# Patient Record
Sex: FEMALE | Race: WHITE | NOT HISPANIC OR LATINO | Employment: FULL TIME | URBAN - METROPOLITAN AREA
[De-identification: names, ages, dates, MRNs, and addresses within clinical notes are randomized per-mention and may not be internally consistent; named-entity substitution may affect disease eponyms.]

---

## 2017-01-09 ENCOUNTER — APPOINTMENT (OUTPATIENT)
Dept: PREADMISSION TESTING | Facility: HOSPITAL | Age: 57
End: 2017-01-09

## 2017-01-09 VITALS
TEMPERATURE: 98.9 F | BODY MASS INDEX: 23.14 KG/M2 | HEIGHT: 66 IN | WEIGHT: 144 LBS | RESPIRATION RATE: 16 BRPM | OXYGEN SATURATION: 100 % | SYSTOLIC BLOOD PRESSURE: 128 MMHG | HEART RATE: 90 BPM | DIASTOLIC BLOOD PRESSURE: 85 MMHG

## 2017-01-09 LAB
ABO GROUP BLD: NORMAL
ANION GAP SERPL CALCULATED.3IONS-SCNC: 13.4 MMOL/L
BASOPHILS # BLD AUTO: 0.05 10*3/MM3 (ref 0–0.2)
BASOPHILS NFR BLD AUTO: 1.2 % (ref 0–1.5)
BLD GP AB SCN SERPL QL: NEGATIVE
BUN BLD-MCNC: 17 MG/DL (ref 6–20)
BUN/CREAT SERPL: 23 (ref 7–25)
CALCIUM SPEC-SCNC: 9.4 MG/DL (ref 8.6–10.5)
CHLORIDE SERPL-SCNC: 102 MMOL/L (ref 98–107)
CO2 SERPL-SCNC: 25.6 MMOL/L (ref 22–29)
CREAT BLD-MCNC: 0.74 MG/DL (ref 0.57–1)
DEPRECATED RDW RBC AUTO: 40.5 FL (ref 37–54)
EOSINOPHIL # BLD AUTO: 0.24 10*3/MM3 (ref 0–0.7)
EOSINOPHIL NFR BLD AUTO: 5.5 % (ref 0.3–6.2)
ERYTHROCYTE [DISTWIDTH] IN BLOOD BY AUTOMATED COUNT: 12 % (ref 11.7–13)
GFR SERPL CREATININE-BSD FRML MDRD: 82 ML/MIN/1.73
GLUCOSE BLD-MCNC: 104 MG/DL (ref 65–99)
HCT VFR BLD AUTO: 38.3 % (ref 35.6–45.5)
HGB BLD-MCNC: 12.8 G/DL (ref 11.9–15.5)
IMM GRANULOCYTES # BLD: 0 10*3/MM3 (ref 0–0.03)
IMM GRANULOCYTES NFR BLD: 0 % (ref 0–0.5)
LYMPHOCYTES # BLD AUTO: 1.52 10*3/MM3 (ref 0.9–4.8)
LYMPHOCYTES NFR BLD AUTO: 35 % (ref 19.6–45.3)
MCH RBC QN AUTO: 31 PG (ref 26.9–32)
MCHC RBC AUTO-ENTMCNC: 33.4 G/DL (ref 32.4–36.3)
MCV RBC AUTO: 92.7 FL (ref 80.5–98.2)
MONOCYTES # BLD AUTO: 0.3 10*3/MM3 (ref 0.2–1.2)
MONOCYTES NFR BLD AUTO: 6.9 % (ref 5–12)
NEUTROPHILS # BLD AUTO: 2.23 10*3/MM3 (ref 1.9–8.1)
NEUTROPHILS NFR BLD AUTO: 51.4 % (ref 42.7–76)
PLATELET # BLD AUTO: 205 10*3/MM3 (ref 140–500)
PMV BLD AUTO: 10.4 FL (ref 6–12)
POTASSIUM BLD-SCNC: 3.7 MMOL/L (ref 3.5–5.2)
RBC # BLD AUTO: 4.13 10*6/MM3 (ref 3.9–5.2)
RH BLD: POSITIVE
SODIUM BLD-SCNC: 141 MMOL/L (ref 136–145)
WBC NRBC COR # BLD: 4.34 10*3/MM3 (ref 4.5–10.7)

## 2017-01-09 PROCEDURE — 86901 BLOOD TYPING SEROLOGIC RH(D): CPT

## 2017-01-09 PROCEDURE — 85025 COMPLETE CBC W/AUTO DIFF WBC: CPT | Performed by: OBSTETRICS & GYNECOLOGY

## 2017-01-09 PROCEDURE — 93010 ELECTROCARDIOGRAM REPORT: CPT | Performed by: INTERNAL MEDICINE

## 2017-01-09 PROCEDURE — 36415 COLL VENOUS BLD VENIPUNCTURE: CPT

## 2017-01-09 PROCEDURE — 86850 RBC ANTIBODY SCREEN: CPT

## 2017-01-09 PROCEDURE — 93005 ELECTROCARDIOGRAM TRACING: CPT

## 2017-01-09 PROCEDURE — 80048 BASIC METABOLIC PNL TOTAL CA: CPT | Performed by: OBSTETRICS & GYNECOLOGY

## 2017-01-09 PROCEDURE — 86900 BLOOD TYPING SEROLOGIC ABO: CPT

## 2017-01-09 RX ORDER — ACETAMINOPHEN 500 MG
500 TABLET ORAL EVERY 6 HOURS PRN
COMMUNITY
End: 2017-01-17 | Stop reason: HOSPADM

## 2017-01-09 NOTE — DISCHARGE INSTRUCTIONS
SURGERY 1-16-17  ARRIVAL TIME 7:00    Take the following medications the morning of surgery with a small sip of water.  GABAPENTIN      General Instructions:  • Do not eat or drink after midnight: includes water, mints, or gum. You may brush your teeth.  • Do not smoke, chew tobacco, or drink alcohol.  • Bring medications in original bottles, any inhalers and if applicable your C-PAP/ BI-PAP machine.  • Bring any papers given to you in the doctor’s office.  • Wear clean comfortable clothes and socks.  • Do not wear contact lenses or make-up.  Bring a case for your glasses if applicable.   • Bring crutches or walker if applicable.  • Leave all other valuables and jewelry at home.    If you were given a blood bank ID arm band remember to bring it with you the day of surgery.    Preventing a Surgical Site Infection:  Shower on the morning of surgery using a fresh bar of anti-bacterial soap (such as Dial) and clean washcloth.  Dry with a clean towel and dress in clean clothing.  For 2 to 3 days before surgery, avoid shaving with a razor near where you will have surgery because the razor can irritate skin and make it easier to develop an infection  Ask your surgeon if you will be receiving antibiotics prior to surgery  Make sure you, your family, and all healthcare providers clean their hands with soap and water or an alcohol based hand  before caring for you or your wound  If at all possible, quit smoking as many days before surgery as you can.    Day of surgery:  Upon arrival, a Pre-op nurse and Anesthesiologist will review your health history, obtain vital signs, and answer questions you may have.  The only belongings needed at this time will be your home medications and if applicable your C-PAP/BI-PAP machine.  If you are staying overnight your family can leave the rest of your belongings in the car and bring them to your room later.  A Pre-op nurse will start an IV and you may receive medication in  preparation for surgery, including something to help you relax.  Your family will be able to see you in the Pre-op area.  While you are in surgery your family should notify the waiting room  if they leave the waiting room area and provide a contact phone number.    Please be aware that surgery does come with discomfort.  We want to make every effort to control your discomfort so please discuss any uncontrolled symptoms with your nurse.   Your doctor will most likely have prescribed pain medications.      If you are going home after surgery you will receive individualized written care instructions before being discharged.  A responsible adult must drive you to and from the hospital on the day of your surgery and stay with you for 24 hours.    If you are staying overnight following surgery, you will be transported to your hospital room following the recovery period.  [facility] has all private rooms.    If you have any questions please call Pre-Admission Testing at [phone].  Deductibles and co-payments are collected on the day of service. Please be prepared to pay the required co-pay, deductible or deposit on the day of service as defined by your plan.

## 2017-01-09 NOTE — MR AVS SNAPSHOT
"Delilah CAROLINA Lito   2017 4:30 PM   Appointment    Provider:  CASIE Reyes   Department:  Psychiatric PREADMISSION T   Dept Phone:  [phone]                Your Full Care Plan              Your Updated Medication List          This list is accurate as of: 17  4:57 PM.  Always use your most recent med list.                acetaminophen 500 MG tablet   Commonly known as:  TYLENOL       CLARITIN PO       gabapentin 600 MG tablet   Commonly known as:  NEURONTIN   Take 1 tablet by mouth 3 (three) times a day.               eCozyt Signup     [facility] lmbang allows you to send messages to your doctor, view your test results, renew your prescriptions, schedule appointments, and more. To sign up, go to [facility] and click on the Sign Up Now link in the New User? box. Enter your lmbang Activation Code exactly as it appears below along with the last four digits of your Social Security Number and your Date of Birth () to complete the sign-up process. If you do not sign up before the expiration date, you must request a new code.    lmbang Activation Code: [email or web address]  Expires: 2017  4:50 PM    If you have questions, you can email [email or web address] or call [phone] to talk to our lmbang staff. Remember, lmbang is NOT to be used for urgent needs. For medical emergencies, dial 911.               Other Info from Your Visit           Allergies     No Known Allergies      Vital Signs     Blood Pressure Pulse Temperature Respirations Height Weight    128/85 (BP Location: Right arm, Patient Position: Sitting) 90 98.9 °F (37.2 °C) (Oral) 16 66\" (167.6 cm) 144 lb (65.3 kg)    Oxygen Saturation Body Mass Index Smoking Status             100% 23.24 kg/m2 Current Every Day Smoker           Discharge Instructions       SURGERY 17  ARRIVAL TIME 7:00    Take the following medications the morning of surgery with a small sip of " water.  GABAPENTIN      General Instructions:  • Do not eat or drink after midnight: includes water, mints, or gum. You may brush your teeth.  • Do not smoke, chew tobacco, or drink alcohol.  • Bring medications in original bottles, any inhalers and if applicable your C-PAP/ BI-PAP machine.  • Bring any papers given to you in the doctor’s office.  • Wear clean comfortable clothes and socks.  • Do not wear contact lenses or make-up.  Bring a case for your glasses if applicable.   • Bring crutches or walker if applicable.  • Leave all other valuables and jewelry at home.    If you were given a blood bank ID arm band remember to bring it with you the day of surgery.    Preventing a Surgical Site Infection:  Shower on the morning of surgery using a fresh bar of anti-bacterial soap (such as Dial) and clean washcloth.  Dry with a clean towel and dress in clean clothing.  For 2 to 3 days before surgery, avoid shaving with a razor near where you will have surgery because the razor can irritate skin and make it easier to develop an infection  Ask your surgeon if you will be receiving antibiotics prior to surgery  Make sure you, your family, and all healthcare providers clean their hands with soap and water or an alcohol based hand  before caring for you or your wound  If at all possible, quit smoking as many days before surgery as you can.    Day of surgery:  Upon arrival, a Pre-op nurse and Anesthesiologist will review your health history, obtain vital signs, and answer questions you may have.  The only belongings needed at this time will be your home medications and if applicable your C-PAP/BI-PAP machine.  If you are staying overnight your family can leave the rest of your belongings in the car and bring them to your room later.  A Pre-op nurse will start an IV and you may receive medication in preparation for surgery, including something to help you relax.  Your family will be able to see you in the Pre-op area.   While you are in surgery your family should notify the waiting room  if they leave the waiting room area and provide a contact phone number.    Please be aware that surgery does come with discomfort.  We want to make every effort to control your discomfort so please discuss any uncontrolled symptoms with your nurse.   Your doctor will most likely have prescribed pain medications.      If you are going home after surgery you will receive individualized written care instructions before being discharged.  A responsible adult must drive you to and from the hospital on the day of your surgery and stay with you for 24 hours.    If you are staying overnight following surgery, you will be transported to your hospital room following the recovery period.  [facility] has all private rooms.    If you have any questions please call Pre-Admission Testing at [phone].  Deductibles and co-payments are collected on the day of service. Please be prepared to pay the required co-pay, deductible or deposit on the day of service as defined by your plan.       SYMPTOMS OF A STROKE    Call 911 or have someone take you to the Emergency Department if you have any of the following:    · Sudden numbness or weakness of your face, arm or leg especially on one side of the body  · Sudden confusion, diffiiculty speaking or trouble understanding   · Changes in your vision or loss of sight in one eye  · Sudden severe headache with no known cause  · sudden dizziness, trouble walking, loss of balance or coordination    It is important to seek emergency care right away if you have further stroke symptoms. If you get emergency help quickly, the powerful clot-dissolving medicines can reduce the disabilities caused by a stroke.     For more information:    American Stroke Association  1-904-8-STROKE  www.strokeassociation.org           IF YOU SMOKE OR USE TOBACCO PLEASE READ THE FOLLOWING:    Why is smoking bad for me?  Smoking  increases the risk of heart disease, lung disease, vascular disease, stroke, and cancer.     If you smoke, STOP!    If you would like more information on quitting smoking, please visit the [employer] website: [email or web address]   This link will provide additional resources including the QUIT line and the Beat the Pack support groups.     For more information:    American Cancer Society  (972) 382-1449    American Heart Association  1-423.697.2970

## 2017-01-16 ENCOUNTER — ANESTHESIA (OUTPATIENT)
Dept: PERIOP | Facility: HOSPITAL | Age: 57
End: 2017-01-16

## 2017-01-16 ENCOUNTER — ANESTHESIA EVENT (OUTPATIENT)
Dept: PERIOP | Facility: HOSPITAL | Age: 57
End: 2017-01-16

## 2017-01-16 PROBLEM — N92.0 MENORRHAGIA: Status: ACTIVE | Noted: 2017-01-16

## 2017-01-16 PROBLEM — N95.0 POST-MENOPAUSAL BLEEDING: Status: ACTIVE | Noted: 2017-01-16

## 2017-01-16 PROCEDURE — 25010000002 PROPOFOL 10 MG/ML EMULSION: Performed by: NURSE ANESTHETIST, CERTIFIED REGISTERED

## 2017-01-16 PROCEDURE — 25010000002 NEOSTIGMINE 10 MG/10ML SOLUTION: Performed by: NURSE ANESTHETIST, CERTIFIED REGISTERED

## 2017-01-16 PROCEDURE — 25010000002 FENTANYL CITRATE (PF) 100 MCG/2ML SOLUTION: Performed by: NURSE ANESTHETIST, CERTIFIED REGISTERED

## 2017-01-16 PROCEDURE — 25010000003 CEFAZOLIN PER 500 MG: Performed by: OBSTETRICS & GYNECOLOGY

## 2017-01-16 RX ORDER — LIDOCAINE HYDROCHLORIDE 20 MG/ML
INJECTION, SOLUTION INFILTRATION; PERINEURAL AS NEEDED
Status: DISCONTINUED | OUTPATIENT
Start: 2017-01-16 | End: 2017-01-16 | Stop reason: SURG

## 2017-01-16 RX ORDER — FENTANYL CITRATE 50 UG/ML
INJECTION, SOLUTION INTRAMUSCULAR; INTRAVENOUS AS NEEDED
Status: DISCONTINUED | OUTPATIENT
Start: 2017-01-16 | End: 2017-01-16 | Stop reason: SURG

## 2017-01-16 RX ORDER — ROCURONIUM BROMIDE 10 MG/ML
INJECTION, SOLUTION INTRAVENOUS AS NEEDED
Status: DISCONTINUED | OUTPATIENT
Start: 2017-01-16 | End: 2017-01-16 | Stop reason: SURG

## 2017-01-16 RX ORDER — NEOSTIGMINE METHYLSULFATE 1 MG/ML
INJECTION, SOLUTION INTRAVENOUS AS NEEDED
Status: DISCONTINUED | OUTPATIENT
Start: 2017-01-16 | End: 2017-01-16 | Stop reason: SURG

## 2017-01-16 RX ORDER — PROPOFOL 10 MG/ML
VIAL (ML) INTRAVENOUS AS NEEDED
Status: DISCONTINUED | OUTPATIENT
Start: 2017-01-16 | End: 2017-01-16 | Stop reason: SURG

## 2017-01-16 RX ORDER — GLYCOPYRROLATE 0.2 MG/ML
INJECTION INTRAMUSCULAR; INTRAVENOUS AS NEEDED
Status: DISCONTINUED | OUTPATIENT
Start: 2017-01-16 | End: 2017-01-16 | Stop reason: SURG

## 2017-01-16 RX ADMIN — ROCURONIUM BROMIDE 40 MG: 10 INJECTION INTRAVENOUS at 09:37

## 2017-01-16 RX ADMIN — CEFAZOLIN SODIUM 1 G: 1 INJECTION, SOLUTION INTRAVENOUS at 09:41

## 2017-01-16 RX ADMIN — LIDOCAINE HYDROCHLORIDE 60 MG: 20 INJECTION, SOLUTION INFILTRATION; PERINEURAL at 09:37

## 2017-01-16 RX ADMIN — SODIUM CHLORIDE, POTASSIUM CHLORIDE, SODIUM LACTATE AND CALCIUM CHLORIDE: 600; 310; 30; 20 INJECTION, SOLUTION INTRAVENOUS at 10:55

## 2017-01-16 RX ADMIN — FENTANYL CITRATE 100 MCG: 50 INJECTION, SOLUTION INTRAMUSCULAR; INTRAVENOUS at 10:29

## 2017-01-16 RX ADMIN — PROPOFOL 160 MG: 10 INJECTION, EMULSION INTRAVENOUS at 09:37

## 2017-01-16 RX ADMIN — ROCURONIUM BROMIDE 10 MG: 10 INJECTION INTRAVENOUS at 10:27

## 2017-01-16 RX ADMIN — FENTANYL CITRATE 100 MCG: 50 INJECTION, SOLUTION INTRAMUSCULAR; INTRAVENOUS at 09:37

## 2017-01-16 RX ADMIN — FENTANYL CITRATE 50 MCG: 50 INJECTION, SOLUTION INTRAMUSCULAR; INTRAVENOUS at 10:55

## 2017-01-16 RX ADMIN — GLYCOPYRROLATE 0.6 MG: 0.2 INJECTION INTRAMUSCULAR; INTRAVENOUS at 10:51

## 2017-01-16 RX ADMIN — NEOSTIGMINE METHYLSULFATE 3 MG: 1 INJECTION INTRAVENOUS at 10:51

## 2017-01-16 RX ADMIN — SODIUM CHLORIDE, POTASSIUM CHLORIDE, SODIUM LACTATE AND CALCIUM CHLORIDE: 600; 310; 30; 20 INJECTION, SOLUTION INTRAVENOUS at 09:39

## 2017-01-16 NOTE — ANESTHESIA PROCEDURE NOTES
Airway  Urgency: elective    Date/Time: 1/16/2017 9:40 AM  Airway not difficult    General Information and Staff    Patient location during procedure: OR  Anesthesiologist: CINDY RUTLEDGE  CRNA: EMELY ALVAREZ    Indications and Patient Condition  Indications for airway management: airway protection    Preoxygenated: yes  Mask difficulty assessment: 1 - vent by mask    Final Airway Details  Final airway type: endotracheal airway      Successful airway: ETT  Cuffed: yes   Successful intubation technique: direct laryngoscopy  Endotracheal tube insertion site: oral  Blade: Wesley  Blade size: #3  ETT size: 7.0 mm  Cormack-Lehane Classification: grade I - full view of glottis  Placement verified by: chest auscultation and capnometry   Cuff volume (mL): 4  Measured from: lips  ETT to lips (cm): 20  Number of attempts at approach: 1    Additional Comments  Smooth IV induction. Trachea intubated. Cuff up. Ett secured. BEBS. Dentition intact without injury.

## 2017-01-16 NOTE — ANESTHESIA PREPROCEDURE EVALUATION
Anesthesia Evaluation      History of anesthetic complications (PONV, reports brief awareness during pleuodesis)   Airway   Mallampati: II  TM distance: >3 FB  Neck ROM: full  no difficulty expected  Dental - normal exam     Pulmonary    (+) hx of smoking (current),   COPD: am productive cough.  Cardiovascular   (+) hypertension,   (-) dysrhythmias, angina, CLARK    Neuro/Psych  GI/Hepatic/Renal/Endo    (-) liver disease, renal disease, diabetes    Musculoskeletal     Abdominal    Substance History      OB/GYN          Other         Other Comment: Hx of chronic pain, after pleuodesis                        Anesthesia Plan    ASA 2     general   (Discussed awareness, use of BIS)  intravenous induction   Anesthetic plan and risks discussed with patient.

## 2017-01-16 NOTE — ANESTHESIA POSTPROCEDURE EVALUATION
Patient: Delilah Morse    Procedure Summary     Date Anesthesia Start Anesthesia Stop Room / Location    01/16/17 0927 1113  [facility] / [facility]       Procedure Diagnosis Surgeon Provider    LAPAROSCOPIC ASSISTED VAGINAL HYSTERECTOMY BILATERAL SALPINGECTOMY (Bilateral Abdomen) No diagnosis on file. MD Parth Espinoza MD          Anesthesia Type: general  Last vitals  /71 (01/16/17 1300)    Temp      Pulse 84 (01/16/17 1300)   Resp 16 (01/16/17 1300)    SpO2 99 % (01/16/17 1300)      Post Anesthesia Care and Evaluation    Patient location during evaluation: PACU  Patient participation: complete - patient participated  Level of consciousness: awake and alert  Anesthetic complications: No anesthetic complications

## 2017-05-23 ENCOUNTER — OFFICE VISIT (OUTPATIENT)
Dept: FAMILY MEDICINE CLINIC | Facility: CLINIC | Age: 57
End: 2017-05-23

## 2017-05-23 VITALS
DIASTOLIC BLOOD PRESSURE: 80 MMHG | HEART RATE: 97 BPM | OXYGEN SATURATION: 100 % | TEMPERATURE: 98.5 F | BODY MASS INDEX: 21.42 KG/M2 | WEIGHT: 133.3 LBS | HEIGHT: 66 IN | SYSTOLIC BLOOD PRESSURE: 118 MMHG

## 2017-05-23 DIAGNOSIS — M79.10 MUSCLE ACHE: ICD-10-CM

## 2017-05-23 DIAGNOSIS — B02.9 VARICELLA ZOSTER: Primary | ICD-10-CM

## 2017-05-23 PROCEDURE — 99213 OFFICE O/P EST LOW 20 MIN: CPT | Performed by: FAMILY MEDICINE

## 2017-05-23 RX ORDER — VALACYCLOVIR HYDROCHLORIDE 1 G/1
1000 TABLET, FILM COATED ORAL 3 TIMES DAILY
Qty: 30 TABLET | Refills: 0 | Status: SHIPPED | OUTPATIENT
Start: 2017-05-23 | End: 2017-06-29 | Stop reason: SDUPTHER

## 2017-05-23 RX ORDER — GABAPENTIN 400 MG/1
400 CAPSULE ORAL 3 TIMES DAILY
COMMUNITY
Start: 2017-05-08 | End: 2017-07-21 | Stop reason: SDUPTHER

## 2017-05-23 RX ORDER — BACLOFEN 20 MG/1
20 TABLET ORAL 3 TIMES DAILY PRN
Qty: 30 TABLET | Refills: 1 | Status: SHIPPED | OUTPATIENT
Start: 2017-05-23 | End: 2017-06-29

## 2017-06-29 ENCOUNTER — OFFICE VISIT (OUTPATIENT)
Dept: FAMILY MEDICINE CLINIC | Facility: CLINIC | Age: 57
End: 2017-06-29

## 2017-06-29 VITALS
TEMPERATURE: 98.3 F | WEIGHT: 136.6 LBS | OXYGEN SATURATION: 100 % | SYSTOLIC BLOOD PRESSURE: 100 MMHG | DIASTOLIC BLOOD PRESSURE: 68 MMHG | BODY MASS INDEX: 21.95 KG/M2 | HEART RATE: 98 BPM | HEIGHT: 66 IN

## 2017-06-29 DIAGNOSIS — B02.9 VARICELLA ZOSTER: ICD-10-CM

## 2017-06-29 DIAGNOSIS — B35.4 TINEA CORPORIS: Primary | ICD-10-CM

## 2017-06-29 PROCEDURE — 99213 OFFICE O/P EST LOW 20 MIN: CPT | Performed by: FAMILY MEDICINE

## 2017-06-29 RX ORDER — VALACYCLOVIR HYDROCHLORIDE 1 G/1
1000 TABLET, FILM COATED ORAL 3 TIMES DAILY
Qty: 30 TABLET | Refills: 0 | Status: SHIPPED | OUTPATIENT
Start: 2017-06-29 | End: 2017-07-09

## 2017-06-29 RX ORDER — CLOTRIMAZOLE AND BETAMETHASONE DIPROPIONATE 10; .64 MG/G; MG/G
CREAM TOPICAL 2 TIMES DAILY
Qty: 45 G | Refills: 1 | Status: SHIPPED | OUTPATIENT
Start: 2017-06-29 | End: 2018-01-19

## 2017-06-29 NOTE — PROGRESS NOTES
Subjective   Delilah Morse is a 56 y.o. female who is here for   Chief Complaint   Patient presents with   • Rash     on stomach, started two weeks ago    .     History of Present Illness   Had small spot of zoster on her right upper inner arm last month  Had shingles multi times  That went away    Now has a larger patch of red itching rash on right upper abdomen under breast  Not on flank nor back  Has chronic night sweats    The following portions of the patient's history were reviewed and updated as appropriate: allergies, current medications, past medical history, past social history, past surgical history and problem list.    Review of Systems    Objective   Physical Exam   Constitutional: She appears well-developed.   Cardiovascular: Normal rate.    Pulmonary/Chest: Effort normal.   Skin: Rash noted. No petechiae noted. Rash is papular.   50 or so small red areas on abd area  Most of the bigger ones have the tops been scratched off   Nursing note and vitals reviewed.      Assessment/Plan   Delilah was seen today for rash.    Diagnoses and all orders for this visit:    Tinea corporis  -     clotrimazole-betamethasone (LOTRISONE) 1-0.05 % cream; Apply  topically 2 (Two) Times a Day.    Varicella zoster  -     valACYclovir (VALTREX) 1000 MG tablet; Take 1 tablet by mouth 3 (Three) Times a Day for 10 days.      There are no Patient Instructions on file for this visit.    Medications Discontinued During This Encounter   Medication Reason   • baclofen (LIORESAL) 20 MG tablet Therapy completed   • valACYclovir (VALTREX) 1000 MG tablet Reorder        No Follow-up on file.    Dr. Juarez García  [place], [place]

## 2017-07-21 ENCOUNTER — OFFICE VISIT (OUTPATIENT)
Dept: FAMILY MEDICINE CLINIC | Facility: CLINIC | Age: 57
End: 2017-07-21

## 2017-07-21 VITALS
DIASTOLIC BLOOD PRESSURE: 70 MMHG | SYSTOLIC BLOOD PRESSURE: 110 MMHG | HEIGHT: 66 IN | OXYGEN SATURATION: 98 % | BODY MASS INDEX: 21.69 KG/M2 | TEMPERATURE: 99 F | WEIGHT: 135 LBS | HEART RATE: 112 BPM

## 2017-07-21 DIAGNOSIS — J90: Primary | ICD-10-CM

## 2017-07-21 PROBLEM — Z77.098 EXPOSURE TO CHEMICAL INHALATION: Status: ACTIVE | Noted: 2017-07-21

## 2017-07-21 PROCEDURE — 99213 OFFICE O/P EST LOW 20 MIN: CPT | Performed by: FAMILY MEDICINE

## 2017-07-21 RX ORDER — GABAPENTIN 400 MG/1
400 CAPSULE ORAL 3 TIMES DAILY
Qty: 90 CAPSULE | Refills: 5 | Status: SHIPPED | OUTPATIENT
Start: 2017-07-21 | End: 2018-01-19 | Stop reason: SDUPTHER

## 2017-07-21 NOTE — PROGRESS NOTES
Subjective   Delilah Morse is a 56 y.o. female who is here for   Chief Complaint   Patient presents with   • Shortness of Breath   .     History of Present Illness   Requesting refill for chronic gabapentin use for her intrathoracic chest pain from several pleurodesis surgeries  Her Pulm MD office requested that I fill, since sofi is not a controlled medication  She has been on this for > 5 years    The following portions of the patient's history were reviewed and updated as appropriate: allergies, current medications, past family history, past medical history, past social history, past surgical history and problem list.    Review of Systems    Objective   Physical Exam   Cardiovascular: Normal rate.    Pulmonary/Chest: Effort normal.   Neurological: She is alert.   Nursing note and vitals reviewed.      Assessment/Plan   Delilah was seen today for shortness of breath.    Diagnoses and all orders for this visit:    Pleurisy, serofibrinous  -     gabapentin (NEURONTIN) 400 MG capsule; Take 1 capsule by mouth 3 (Three) Times a Day. Indications: Pain Following an Operation, THORACOSCOPY W/ TALC PLEURODESIS      There are no Patient Instructions on file for this visit.    Medications Discontinued During This Encounter   Medication Reason   • gabapentin (NEURONTIN) 400 MG capsule Reorder        No Follow-up on file.    Dr. Juarez García  [place], [place]

## 2017-08-17 DIAGNOSIS — Z00.00 ROUTINE HEALTH MAINTENANCE: Primary | ICD-10-CM

## 2017-08-18 ENCOUNTER — RESULTS ENCOUNTER (OUTPATIENT)
Dept: FAMILY MEDICINE CLINIC | Facility: CLINIC | Age: 57
End: 2017-08-18

## 2017-08-18 ENCOUNTER — LAB (OUTPATIENT)
Dept: FAMILY MEDICINE CLINIC | Facility: CLINIC | Age: 57
End: 2017-08-18

## 2017-08-18 DIAGNOSIS — Z00.00 ROUTINE HEALTH MAINTENANCE: ICD-10-CM

## 2017-08-19 LAB
ALBUMIN SERPL-MCNC: 4.9 G/DL (ref 3.5–5.5)
ALBUMIN/GLOB SERPL: 2.7 {RATIO} (ref 1.2–2.2)
ALP SERPL-CCNC: 87 IU/L (ref 39–117)
ALT SERPL-CCNC: 11 IU/L (ref 0–32)
APPEARANCE UR: CLEAR
AST SERPL-CCNC: 15 IU/L (ref 0–40)
BILIRUB SERPL-MCNC: 0.4 MG/DL (ref 0–1.2)
BILIRUB UR QL STRIP: NEGATIVE
BUN SERPL-MCNC: 19 MG/DL (ref 6–24)
BUN/CREAT SERPL: 31 (ref 9–23)
CALCIUM SERPL-MCNC: 9.8 MG/DL (ref 8.7–10.2)
CHLORIDE SERPL-SCNC: 100 MMOL/L (ref 96–106)
CHOLEST SERPL-MCNC: 207 MG/DL (ref 100–199)
CO2 SERPL-SCNC: 24 MMOL/L (ref 18–29)
COLOR UR: YELLOW
CREAT SERPL-MCNC: 0.61 MG/DL (ref 0.57–1)
ERYTHROCYTE [DISTWIDTH] IN BLOOD BY AUTOMATED COUNT: 13.6 % (ref 12.3–15.4)
GLOBULIN SER CALC-MCNC: 1.8 G/DL (ref 1.5–4.5)
GLUCOSE SERPL-MCNC: 99 MG/DL (ref 65–99)
GLUCOSE UR QL: NEGATIVE
HCT VFR BLD AUTO: 41.2 % (ref 34–46.6)
HDLC SERPL-MCNC: 111 MG/DL
HGB BLD-MCNC: 14 G/DL (ref 11.1–15.9)
HGB UR QL STRIP: NEGATIVE
KETONES UR QL STRIP: NEGATIVE
LDLC SERPL CALC-MCNC: 81 MG/DL (ref 0–99)
LDLC/HDLC SERPL: 0.7 RATIO UNITS (ref 0–3.2)
LEUKOCYTE ESTERASE UR QL STRIP: NEGATIVE
Lab: NORMAL
MCH RBC QN AUTO: 30.9 PG (ref 26.6–33)
MCHC RBC AUTO-ENTMCNC: 34 G/DL (ref 31.5–35.7)
MCV RBC AUTO: 91 FL (ref 79–97)
MICRO URNS: NORMAL
NITRITE UR QL STRIP: NEGATIVE
PH UR STRIP: 6.5 [PH] (ref 5–7.5)
PLATELET # BLD AUTO: 233 X10E3/UL (ref 150–379)
POTASSIUM SERPL-SCNC: 4.6 MMOL/L (ref 3.5–5.2)
PROT SERPL-MCNC: 6.7 G/DL (ref 6–8.5)
PROT UR QL STRIP: NEGATIVE
RBC # BLD AUTO: 4.53 X10E6/UL (ref 3.77–5.28)
SODIUM SERPL-SCNC: 141 MMOL/L (ref 134–144)
SP GR UR: 1.01 (ref 1–1.03)
TRIGL SERPL-MCNC: 77 MG/DL (ref 0–149)
TSH SERPL DL<=0.005 MIU/L-ACNC: 1.18 UIU/ML (ref 0.45–4.5)
UROBILINOGEN UR STRIP-MCNC: 0.2 MG/DL (ref 0.2–1)
VLDLC SERPL CALC-MCNC: 15 MG/DL (ref 5–40)
WBC # BLD AUTO: 5 X10E3/UL (ref 3.4–10.8)

## 2017-08-29 ENCOUNTER — OFFICE VISIT (OUTPATIENT)
Dept: FAMILY MEDICINE CLINIC | Facility: CLINIC | Age: 57
End: 2017-08-29

## 2017-08-29 VITALS
SYSTOLIC BLOOD PRESSURE: 110 MMHG | HEIGHT: 66 IN | HEART RATE: 88 BPM | OXYGEN SATURATION: 99 % | BODY MASS INDEX: 21.78 KG/M2 | DIASTOLIC BLOOD PRESSURE: 70 MMHG | TEMPERATURE: 98.5 F | WEIGHT: 135.5 LBS

## 2017-08-29 DIAGNOSIS — Z00.00 ROUTINE ADULT HEALTH MAINTENANCE: Primary | ICD-10-CM

## 2017-08-29 PROCEDURE — 99406 BEHAV CHNG SMOKING 3-10 MIN: CPT | Performed by: FAMILY MEDICINE

## 2017-08-29 PROCEDURE — 99396 PREV VISIT EST AGE 40-64: CPT | Performed by: FAMILY MEDICINE

## 2017-08-29 NOTE — PROGRESS NOTES
"  Chief Complaint   Patient presents with   • Annual Exam       Subjective   Delilah Morse is a 56 y.o. female and is here for a yearly physical exam. The patient reports no problems.    Do you take any herbs or supplements that were not prescribed by a doctor? no. If so, these will be added to active medication list.    The following portions of the patient's history were reviewed and updated as appropriate: allergies, current medications, past family history, past medical history, past social history, past surgical history and problem list.    Social and Family and Surgical History reviewed and updated today, see Rooming tab.    Health History, Preventive Measures and Vaccination flow sheets reviewed and updated today.    Patient's current medical chart in Epic; including previous office notes, imaging, labs, specialist's evaluation either in notes or in Media tab reviewed today.    Other pertinent medical information also reviewed thru Care Everywhere function is also reviewed today.    Review of Systems  Review of Systems  Respiratory: positive for chronic bronchitis, cough and pleurisy/chest pain    Vitals:    08/29/17 0845   BP: 110/70   BP Location: Left arm   Patient Position: Sitting   Pulse: 88   Temp: 98.5 °F (36.9 °C)   SpO2: 99%   Weight: 135 lb 8 oz (61.5 kg)   Height: 66\" (167.6 cm)       General Appearance:  Alert, cooperative, no distress, appears stated age   Head:  Normocephalic, without obvious abnormality, atraumatic   Eyes:  PERRL, conjunctiva/corneas clear, EOM's intact.   Ears:  Normal TM's and external ear canals, both ears   Nose: Nares normal, septum midline, mucosa normal, no drainage or sinus tenderness   Throat: Lips, mucosa, and tongue normal; teeth and gums normal   Neck: Supple, symmetrical, trachea midline, no adenopathy;   thyroid: No enlargement/tenderness/nodules; no carotid  bruit   Back:  Symmetric, no curvature, ROM normal, no CVA tenderness   Lungs:  Clear to " auscultation bilaterally, respirations unlabored   Chest wall:  No tenderness or deformity   Heart:  Regular rate and rhythm, S1 and S2 normal, no murmur, rub or gallop   Abdomen:  Soft, non-tender, bowel sounds active all four quadrants,   no masses, no organomegaly   Rectal:        Extremities: Extremities normal, atraumatic, no cyanosis or edema   Pulses: 2+ and symmetric all extremities   Skin: Skin color, texture, turgor normal, no rashes or lesions   Lymph nodes: Cervical, supraclavicular, and axillary nodes normal   Neurologic: CNII-XII intact. Normal strength, sensation and reflexes   throughout          Results for orders placed or performed in visit on 08/18/17   Comprehensive metabolic panel   Result Value Ref Range    Glucose 99 65 - 99 mg/dL    BUN 19 6 - 24 mg/dL    Creatinine 0.61 0.57 - 1.00 mg/dL    eGFR Non African Am 103 >59 mL/min/1.73    eGFR African Am 119 >59 mL/min/1.73    BUN/Creatinine Ratio 31 (H) 9 - 23    Sodium 141 134 - 144 mmol/L    Potassium 4.6 3.5 - 5.2 mmol/L    Chloride 100 96 - 106 mmol/L    Total CO2 24 18 - 29 mmol/L    Calcium 9.8 8.7 - 10.2 mg/dL    Total Protein 6.7 6.0 - 8.5 g/dL    Albumin 4.9 3.5 - 5.5 g/dL    Globulin 1.8 1.5 - 4.5 g/dL    A/G Ratio 2.7 (H) 1.2 - 2.2    Total Bilirubin 0.4 0.0 - 1.2 mg/dL    Alkaline Phosphatase 87 39 - 117 IU/L    AST (SGOT) 15 0 - 40 IU/L    ALT (SGPT) 11 0 - 32 IU/L   CBC (No Diff)   Result Value Ref Range    WBC 5.0 3.4 - 10.8 x10E3/uL    RBC 4.53 3.77 - 5.28 x10E6/uL    Hemoglobin 14.0 11.1 - 15.9 g/dL    Hematocrit 41.2 34.0 - 46.6 %    MCV 91 79 - 97 fL    MCH 30.9 26.6 - 33.0 pg    MCHC 34.0 31.5 - 35.7 g/dL    RDW 13.6 12.3 - 15.4 %    Platelets 233 150 - 379 x10E3/uL   TSH   Result Value Ref Range    TSH 1.180 0.450 - 4.500 uIU/mL   Lipid Panel With LDL / HDL Ratio   Result Value Ref Range    Total Cholesterol 207 (H) 100 - 199 mg/dL    Triglycerides 77 0 - 149 mg/dL    HDL Cholesterol 111 >39 mg/dL    VLDL Cholesterol 15 5 -  40 mg/dL    LDL Cholesterol  81 0 - 99 mg/dL    LDL/HDL Ratio 0.7 0.0 - 3.2 ratio units   Urinalysis With / Microscopic If Indicated   Result Value Ref Range    Specific Gravity, UA 1.006 1.005 - 1.030    pH, UA 6.5 5.0 - 7.5    Color, UA Yellow Yellow    Appearance, UA Clear Clear    Leukocytes, UA Negative Negative    Protein Negative Negative/Trace    Glucose, UA Negative Negative    Ketones Negative Negative    Blood, UA Negative Negative    Bilirubin, UA Negative Negative    Urobilinogen, UA 0.2 0.2 - 1.0 mg/dL    Nitrite, UA Negative Negative    Microscopic Examination Comment    Please Note   Result Value Ref Range    Please note Comment      Assessment/Plan   Healthy female exam.  Delilah was seen today for annual exam.    Diagnoses and all orders for this visit:    Routine adult health maintenance      1. Labs ok.  Did well after her hysterectomy this spring, pre op anemia has resolved.    2. Patient Counseling:  --Nutrition: Stressed importance of moderation in sodium/caffeine intake, saturated fat and cholesterol.  Discussed caloric balance, sufficient intake of fresh fruits, vegetables, fiber, calcium, iron. Ok here  --Discussed the daily use of baby aspirin, if indicated. Not needed at this time  --Exercise: Stressed the importance of regular exercise.   --Substance Abuse: Discussed cessation/primary prevention of tobacco, alcohol, or other drug use; driving or other dangerous activities under the influence. Needs to stop smoking   --Dental health: Discussed importance of regular tooth brushing, flossing, and dental visits.utd  -- suggested having eyes and vision checked if needed or past due.  --Immunizations reviewed.  --Discussed benefits of screening colonoscopy.due every 5 yrs. As her mother had colon cancer. She reported GYN preformed a genetic test for colon cancer and she was -.  But i informed her she still needs the scopes.    3. Discussed the patient's BMI with her.  The BMI is in the  acceptable range  4. Follow up in 5 months    There are no Patient Instructions on file for this visit.    Medications Discontinued During This Encounter   Medication Reason   • Loratadine (CLARITIN PO) Therapy completed        Dr. Juarez García MD  [place], [place]  [facility]

## 2018-01-19 ENCOUNTER — OFFICE VISIT (OUTPATIENT)
Dept: FAMILY MEDICINE CLINIC | Facility: CLINIC | Age: 58
End: 2018-01-19

## 2018-01-19 VITALS
BODY MASS INDEX: 23.3 KG/M2 | SYSTOLIC BLOOD PRESSURE: 130 MMHG | OXYGEN SATURATION: 98 % | WEIGHT: 145 LBS | TEMPERATURE: 98.3 F | HEART RATE: 92 BPM | HEIGHT: 66 IN | DIASTOLIC BLOOD PRESSURE: 82 MMHG

## 2018-01-19 DIAGNOSIS — J90: Primary | ICD-10-CM

## 2018-01-19 PROCEDURE — 99213 OFFICE O/P EST LOW 20 MIN: CPT | Performed by: FAMILY MEDICINE

## 2018-01-19 RX ORDER — GABAPENTIN 400 MG/1
400 CAPSULE ORAL 3 TIMES DAILY
Qty: 90 CAPSULE | Refills: 5 | Status: SHIPPED | OUTPATIENT
Start: 2018-01-19 | End: 2018-07-13 | Stop reason: SDUPTHER

## 2018-01-19 NOTE — PROGRESS NOTES
Subjective   Delilah Morse is a 57 y.o. female who is here for   Chief Complaint   Patient presents with   • Follow-up   • Chest Pain     from old chest surgery    • Med Refill   .     History of Present Illness   Chronic pleurisy pain from multi previous VATS for PTX  Skinny working great for her  Working full time      The following portions of the patient's history were reviewed and updated as appropriate: allergies, current medications, past family history, past medical history, past social history, past surgical history and problem list.    Review of Systems    Objective   Physical Exam   Constitutional: She appears well-developed and well-nourished.   Cardiovascular: Normal rate.    Pulmonary/Chest: Effort normal.   Neurological: She is alert.   Psychiatric: She has a normal mood and affect.   Nursing note and vitals reviewed.      Assessment/Plan   Delilah was seen today for follow-up, chest pain and med refill.    Diagnoses and all orders for this visit:    Pleurisy, serofibrinous  -     gabapentin (NEURONTIN) 400 MG capsule; Take 1 capsule by mouth 3 (Three) Times a Day. Indications: Pain Following an Operation, THORACOSCOPY W/ TALC PLEURODESIS      Patient Instructions   Patient has been prescribed controlled medications.  Treatment discussed  SARATH updated and reviewed.  Risk and Benefits discussed.  Per [place].      Medications Discontinued During This Encounter   Medication Reason   • clotrimazole-betamethasone (LOTRISONE) 1-0.05 % cream Therapy completed   • gabapentin (NEURONTIN) 400 MG capsule Reorder        Return in about 6 months (around 7/19/2018) for Annual physical.    Dr. Juarez García  [place], [place]

## 2018-01-19 NOTE — PATIENT INSTRUCTIONS
Patient has been prescribed controlled medications.  Treatment discussed  SARATH updated and reviewed.  Risk and Benefits discussed.  Per [place]HB1.

## 2018-02-27 ENCOUNTER — OFFICE VISIT (OUTPATIENT)
Dept: FAMILY MEDICINE CLINIC | Facility: CLINIC | Age: 58
End: 2018-02-27

## 2018-02-27 VITALS
HEART RATE: 102 BPM | TEMPERATURE: 98.5 F | SYSTOLIC BLOOD PRESSURE: 120 MMHG | HEIGHT: 66 IN | DIASTOLIC BLOOD PRESSURE: 76 MMHG | WEIGHT: 144.5 LBS | BODY MASS INDEX: 23.22 KG/M2 | OXYGEN SATURATION: 99 %

## 2018-02-27 DIAGNOSIS — J40 BRONCHITIS: Primary | ICD-10-CM

## 2018-02-27 PROCEDURE — 99213 OFFICE O/P EST LOW 20 MIN: CPT | Performed by: FAMILY MEDICINE

## 2018-02-27 RX ORDER — GUAIFENESIN AND CODEINE PHOSPHATE 100; 10 MG/5ML; MG/5ML
5 SOLUTION ORAL 3 TIMES DAILY PRN
Qty: 118 ML | Refills: 0 | Status: SHIPPED | OUTPATIENT
Start: 2018-02-27 | End: 2018-07-13

## 2018-02-27 RX ORDER — AMOXICILLIN 500 MG/1
500 TABLET, FILM COATED ORAL 3 TIMES DAILY
Qty: 21 TABLET | Refills: 0 | Status: SHIPPED | OUTPATIENT
Start: 2018-02-27 | End: 2018-03-06

## 2018-02-27 RX ORDER — FLUCONAZOLE 150 MG/1
150 TABLET ORAL DAILY
Qty: 3 TABLET | Refills: 0 | Status: SHIPPED | OUTPATIENT
Start: 2018-02-27 | End: 2018-07-13

## 2018-02-27 NOTE — PROGRESS NOTES
"  Chief Complaint   Patient presents with   • Cough     x 4 days    • Nasal Congestion       Upper Respiratory Infection: Patient complains of symptoms of a URI. Symptoms include congestion, cough and sore throat. Onset of symptoms was 5 days ago, gradually worsening since that time. She also c/o achiness, congestion, lightheadedness, low grade fever, nausea without vomiting and productive cough with  yellow colored sputum for the past 2 days .  She is drinking plenty of fluids. Evaluation to date: none. Treatment to date: cough suppressants.  Ill contacts at home or school or work discussed.      Vitals:    02/27/18 1551   BP: 120/76   BP Location: Left arm   Patient Position: Sitting   Pulse: 102   Temp: 98.5 °F (36.9 °C)   SpO2: 99%   Weight: 65.5 kg (144 lb 8 oz)   Height: 167.6 cm (66\")     Gen: mildly ill appearing, alert  Ears: Tm's bulging without redness  Nose:  Congestion  Throat:  Red without exudate, some drainage, tonsils okay  Neck: no LAD  Lung: rhonchi in bases, good air movement, regular RR  Heart: RR without murmur  Skin: no rash.      Assessment/Plan   Delilah was seen today for cough and nasal congestion.    Diagnoses and all orders for this visit:    Bronchitis  -     amoxicillin (AMOXIL) 500 MG tablet; Take 1 tablet by mouth 3 (Three) Times a Day for 7 days.  -     guaifenesin-codeine (GUAIFENESIN AC) 100-10 MG/5ML liquid; Take 5 mL by mouth 3 (Three) Times a Day As Needed for Cough or Congestion.    Other orders  -     fluconazole (DIFLUCAN) 150 MG tablet; Take 1 tablet by mouth Daily.             There are no Patient Instructions on file for this visit.    Tylenol or Advil as needed for pain, fever, muscle aches  Plenty of fluids  Hand washing discussed  Off work or school note given if needed.  Warm tea for throat.  Pros and cons of antibiotic use discussed    Dr. Juarez García MD  [place], [place]  [facility]  "

## 2018-07-13 ENCOUNTER — OFFICE VISIT (OUTPATIENT)
Dept: FAMILY MEDICINE CLINIC | Facility: CLINIC | Age: 58
End: 2018-07-13

## 2018-07-13 VITALS
BODY MASS INDEX: 24.25 KG/M2 | HEIGHT: 66 IN | OXYGEN SATURATION: 99 % | SYSTOLIC BLOOD PRESSURE: 140 MMHG | HEART RATE: 120 BPM | WEIGHT: 150.9 LBS | DIASTOLIC BLOOD PRESSURE: 78 MMHG

## 2018-07-13 DIAGNOSIS — Z51.81 MEDICATION MONITORING ENCOUNTER: ICD-10-CM

## 2018-07-13 DIAGNOSIS — J90: Primary | ICD-10-CM

## 2018-07-13 PROCEDURE — 99213 OFFICE O/P EST LOW 20 MIN: CPT | Performed by: FAMILY MEDICINE

## 2018-07-13 RX ORDER — TIOTROPIUM BROMIDE AND OLODATEROL 3.124; 2.736 UG/1; UG/1
SPRAY, METERED RESPIRATORY (INHALATION)
COMMUNITY
Start: 2018-04-30 | End: 2019-01-14

## 2018-07-13 RX ORDER — GABAPENTIN 400 MG/1
400 CAPSULE ORAL 3 TIMES DAILY
Qty: 90 CAPSULE | Refills: 5 | Status: SHIPPED | OUTPATIENT
Start: 2018-07-13 | End: 2019-01-14 | Stop reason: SDUPTHER

## 2018-07-13 NOTE — PROGRESS NOTES
Subjective   Delilah Morse is a 57 y.o. female who is here for   Chief Complaint   Patient presents with   • Follow-up   • Pain With Breathing   • Med Refill   .     History of Present Illness   intrathoracic chest pain is stable  Breathing ok  No fever.  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    The following portions of the patient's history were reviewed and updated as appropriate: allergies, current medications, past family history, past medical history, past social history, past surgical history and problem list.    Review of Systems    Objective   Physical Exam   Constitutional: She appears well-developed and well-nourished.   Cardiovascular: Normal rate.    Pulmonary/Chest: Effort normal. She has rales in the right lower field and the left lower field.   Nursing note and vitals reviewed.      Assessment/Plan   Delilah was seen today for follow-up, pain with breathing and med refill.    Diagnoses and all orders for this visit:    Pleurisy, serofibrinous  -     gabapentin (NEURONTIN) 400 MG capsule; Take 1 capsule by mouth 3 (Three) Times a Day.    Medication monitoring encounter  -     Compliance Drug Analysis, Ur - Urine, Clean Catch      There are no Patient Instructions on file for this visit.    Medications Discontinued During This Encounter   Medication Reason   • guaifenesin-codeine (GUAIFENESIN AC) 100-10 MG/5ML liquid *Therapy completed   • fluconazole (DIFLUCAN) 150 MG tablet *Therapy completed   • gabapentin (NEURONTIN) 400 MG capsule Reorder        Return for Next scheduled follow up.    Dr. Juarez García  [place], [place]

## 2018-07-19 LAB — DRUGS UR: NORMAL

## 2018-08-10 ENCOUNTER — OFFICE VISIT (OUTPATIENT)
Dept: FAMILY MEDICINE CLINIC | Facility: CLINIC | Age: 58
End: 2018-08-10

## 2018-08-10 ENCOUNTER — HOSPITAL ENCOUNTER (OUTPATIENT)
Dept: GENERAL RADIOLOGY | Facility: HOSPITAL | Age: 58
Discharge: HOME | End: 2018-08-10
Attending: FAMILY MEDICINE | Admitting: FAMILY MEDICINE

## 2018-08-10 ENCOUNTER — HOSPITAL ENCOUNTER (OUTPATIENT)
Dept: GENERAL RADIOLOGY | Facility: HOSPITAL | Age: 58
Discharge: HOME | End: 2018-08-10
Attending: FAMILY MEDICINE

## 2018-08-10 VITALS
SYSTOLIC BLOOD PRESSURE: 110 MMHG | OXYGEN SATURATION: 100 % | BODY MASS INDEX: 24.43 KG/M2 | HEART RATE: 109 BPM | HEIGHT: 66 IN | DIASTOLIC BLOOD PRESSURE: 70 MMHG | WEIGHT: 152 LBS

## 2018-08-10 DIAGNOSIS — M25.571 ACUTE RIGHT ANKLE PAIN: ICD-10-CM

## 2018-08-10 DIAGNOSIS — M79.672 CHRONIC HEEL PAIN, LEFT: ICD-10-CM

## 2018-08-10 DIAGNOSIS — M72.2 PLANTAR FASCIITIS, LEFT: ICD-10-CM

## 2018-08-10 DIAGNOSIS — M79.672 CHRONIC HEEL PAIN, LEFT: Primary | ICD-10-CM

## 2018-08-10 DIAGNOSIS — G89.29 CHRONIC HEEL PAIN, LEFT: ICD-10-CM

## 2018-08-10 DIAGNOSIS — G89.29 CHRONIC HEEL PAIN, LEFT: Primary | ICD-10-CM

## 2018-08-10 PROCEDURE — 73610 X-RAY EXAM OF ANKLE: CPT

## 2018-08-10 PROCEDURE — 99213 OFFICE O/P EST LOW 20 MIN: CPT | Performed by: FAMILY MEDICINE

## 2018-08-10 PROCEDURE — 73650 X-RAY EXAM OF HEEL: CPT

## 2018-08-10 NOTE — PROGRESS NOTES
Subjective   Delilah Morse is a 57 y.o. female who is here for   Chief Complaint   Patient presents with   • Follow-up     ICC    • Foot Injury     right foot    .     History of Present Illness   Ankle Pain: Patient complains of injury to the right ankle. This is evaluated as a personal injury. The injury occurred 2 weeks ago, and occurred while rolled her right ankle.  The patient states the ankle rolled inward at the time of injury.  She did hear or sense a pop or snap at the time of the injury. The patient notes pain and moderate and localized swelling of the ankle since the injury. She has treated the ankle with sugar tong air cast. Pain is localized to the lateral malleolar area. She has not sprained this ankle in the past.  went to [facility]  [place]  Swelling and purple is better  But now since she is placing most of her weight on her already painful left heel, she is having a hard time walking to painful feet.      The following portions of the patient's history were reviewed and updated as appropriate: allergies, current medications, past medical history, past social history, past surgical history and problem list.    Review of Systems    Objective   Physical Exam   Constitutional: She appears well-nourished.   Musculoskeletal:        Right ankle: She exhibits decreased range of motion, swelling and ecchymosis. Tenderness. Medial malleolus tenderness found.        Left foot: There is tenderness.        Nursing note and vitals reviewed.    XR ANKLE 3+ VW RIGHT-: 8/10/2018 2:45 PM     INDICATION:   Right ankle pain. Twisted the ankle.     COMPARISON:   None available.     FINDINGS:  3 view(s) of the right ankle.  There is lateral soft tissue swelling. No  fracture.. No bone erosion or destruction.  No foreign body. Small  calcaneal enthesopathy at the origin of the plantar fascia.     IMPRESSION:  Lateral soft tissue swelling. No fracture.     This report was finalized on 8/10/2018 3:22 PM by   Kunal Eagle MD.       Assessment/Plan   Delilah was seen today for follow-up and foot injury.    Diagnoses and all orders for this visit:    Chronic heel pain, left  -     XR calcaneUS 2+ vw left; Future    Acute right ankle pain  -     XR Ankle 3+ View Right; Future    Plantar fasciitis, left    so good news , no fracture in the right ankle.  Ok to use the ankle splint you have now  Apply a compressive ACE bandage. Rest and elevate the affected painful area.  Apply cold compresses intermittently as needed.  As pain recedes, begin normal activities slowly as tolerated.  Call if symptoms persist.    Hand out on plantar fascitis on the left heel given.      There are no Patient Instructions on file for this visit.    There are no discontinued medications.     No Follow-up on file.    Dr. Juarez García  [facility] [place], [place]

## 2018-08-24 DIAGNOSIS — Z00.00 ROUTINE ADULT HEALTH MAINTENANCE: Primary | ICD-10-CM

## 2018-08-24 DIAGNOSIS — R53.82 CHRONIC FATIGUE: ICD-10-CM

## 2018-08-24 DIAGNOSIS — Z51.81 MEDICATION MONITORING ENCOUNTER: ICD-10-CM

## 2018-08-24 LAB
ALBUMIN SERPL-MCNC: 4.5 G/DL (ref 3.5–5.2)
ALBUMIN/GLOB SERPL: 2.5 G/DL
ALP SERPL-CCNC: 76 U/L (ref 40–129)
ALT SERPL-CCNC: 14 U/L (ref 5–33)
APPEARANCE UR: CLEAR
AST SERPL-CCNC: 14 U/L (ref 5–32)
BILIRUB SERPL-MCNC: 0.2 MG/DL (ref 0.2–1.2)
BILIRUB UR QL STRIP: NEGATIVE
BUN SERPL-MCNC: 27 MG/DL (ref 6–20)
BUN/CREAT SERPL: 36.5 (ref 7–25)
CALCIUM SERPL-MCNC: 9.5 MG/DL (ref 8.6–10.5)
CHLORIDE SERPL-SCNC: 103 MMOL/L (ref 98–107)
CHOLEST SERPL-MCNC: 208 MG/DL (ref 0–200)
CO2 SERPL-SCNC: 27.2 MMOL/L (ref 22–29)
COLOR UR: NORMAL
CREAT SERPL-MCNC: 0.74 MG/DL (ref 0.57–1)
ERYTHROCYTE [DISTWIDTH] IN BLOOD BY AUTOMATED COUNT: 12.6 % (ref 11.5–14.5)
GLOBULIN SER CALC-MCNC: 1.8 GM/DL
GLUCOSE SERPL-MCNC: 88 MG/DL (ref 65–99)
GLUCOSE UR QL: NEGATIVE
HCT VFR BLD AUTO: 39.7 % (ref 37–47)
HDLC SERPL-MCNC: 91 MG/DL (ref 40–60)
HGB BLD-MCNC: 13.2 G/DL (ref 12–16)
HGB UR QL STRIP: NEGATIVE
KETONES UR QL STRIP: NEGATIVE
LDLC SERPL CALC-MCNC: 98 MG/DL (ref 0–100)
LDLC/HDLC SERPL: 1.08 {RATIO}
LEUKOCYTE ESTERASE UR QL STRIP: NEGATIVE
MCH RBC QN AUTO: 31.4 PG (ref 27–31)
MCHC RBC AUTO-ENTMCNC: 33.2 G/DL (ref 31–37)
MCV RBC AUTO: 94.3 FL (ref 81–99)
NITRITE UR QL STRIP: NEGATIVE
PH UR STRIP: 5.5 [PH] (ref 4.5–8)
PLATELET # BLD AUTO: 210 10*3/MM3 (ref 140–500)
POTASSIUM SERPL-SCNC: 5 MMOL/L (ref 3.5–5.2)
PROT SERPL-MCNC: 6.3 G/DL (ref 6–8.5)
PROT UR QL STRIP: NEGATIVE
RBC # BLD AUTO: 4.21 10*6/MM3 (ref 4.2–5.4)
SODIUM SERPL-SCNC: 142 MMOL/L (ref 136–145)
SP GR UR: 1.02 (ref 1–1.03)
TRIGL SERPL-MCNC: 95 MG/DL (ref 0–150)
TSH SERPL DL<=0.005 MIU/L-ACNC: 1.26 MIU/ML (ref 0.27–4.2)
UROBILINOGEN UR STRIP-MCNC: NORMAL MG/DL
VLDLC SERPL CALC-MCNC: 19 MG/DL (ref 7–27)
WBC # BLD AUTO: 5.48 10*3/MM3 (ref 4.8–10.8)

## 2018-08-31 ENCOUNTER — OFFICE VISIT (OUTPATIENT)
Dept: FAMILY MEDICINE CLINIC | Facility: CLINIC | Age: 58
End: 2018-08-31

## 2018-08-31 VITALS
OXYGEN SATURATION: 99 % | BODY MASS INDEX: 24.12 KG/M2 | HEART RATE: 110 BPM | DIASTOLIC BLOOD PRESSURE: 66 MMHG | HEIGHT: 66 IN | WEIGHT: 150.1 LBS | SYSTOLIC BLOOD PRESSURE: 120 MMHG

## 2018-08-31 DIAGNOSIS — J90: Primary | ICD-10-CM

## 2018-08-31 DIAGNOSIS — Z00.00 ROUTINE ADULT HEALTH MAINTENANCE: ICD-10-CM

## 2018-08-31 DIAGNOSIS — Z12.39 SCREENING FOR BREAST CANCER: ICD-10-CM

## 2018-08-31 DIAGNOSIS — Z12.11 SCREEN FOR COLON CANCER: ICD-10-CM

## 2018-08-31 PROBLEM — M79.10 MUSCLE ACHE: Status: RESOLVED | Noted: 2017-05-23 | Resolved: 2018-08-31

## 2018-08-31 PROCEDURE — 99396 PREV VISIT EST AGE 40-64: CPT | Performed by: FAMILY MEDICINE

## 2018-09-11 ENCOUNTER — TELEPHONE (OUTPATIENT)
Dept: GASTROENTEROLOGY | Facility: CLINIC | Age: 58
End: 2018-09-11

## 2018-09-11 DIAGNOSIS — Z12.11 COLON CANCER SCREENING: Primary | ICD-10-CM

## 2018-09-12 ENCOUNTER — HOSPITAL ENCOUNTER (OUTPATIENT)
Dept: MAMMOGRAPHY | Facility: HOSPITAL | Age: 58
Discharge: HOME | End: 2018-09-12
Attending: FAMILY MEDICINE | Admitting: FAMILY MEDICINE

## 2018-09-12 DIAGNOSIS — Z12.39 SCREENING FOR BREAST CANCER: ICD-10-CM

## 2018-09-12 PROBLEM — Z12.11 COLON CANCER SCREENING: Status: ACTIVE | Noted: 2018-09-12

## 2018-09-12 PROCEDURE — 77063 BREAST TOMOSYNTHESIS BI: CPT

## 2018-09-12 PROCEDURE — 77067 SCR MAMMO BI INCL CAD: CPT

## 2018-09-12 RX ORDER — SODIUM PICOSULFATE, MAGNESIUM OXIDE, AND ANHYDROUS CITRIC ACID 10; 3.5; 12 MG/160ML; G/160ML; G/160ML
1 LIQUID ORAL 2 TIMES DAILY
Qty: 320 ML | Refills: 0 | Status: ON HOLD | OUTPATIENT
Start: 2018-09-12 | End: 2018-10-12

## 2018-09-12 NOTE — TELEPHONE ENCOUNTER
Emailed instructions.    Dr. Nika Schaffer   Date: __10/12/18________     Arrival Time: __8:15am_______    Hospital:  [facility] ([address] [place] [address] [place] [address]) (back of hospital at the emergency room) or       Please call the office as soon as possible if you need to reschedule or cancel your procedure please give two weeks’ notice.  If you do not show up or frequently reschedule your procedure, your provider has the option of not rescheduling.    Stop the following medications 5 days prior to your procedure- check with the prescribing doctor prior to holding.  No Aspirin, Advil, Aleve, Motrin, IBU or anything listed on the back of this form.    If you are taking any medications on the attached list and/or pills that contain iron please stop them one week prior. Insulin will be addressed separately.    1.  your prescription AND a 10 oz. bottle of Magnesium Citrate (over the counter) as soon as possible. Do not wait until the day before in case of issues with cost or etc.    The day before your procedure  It is very important that you follow the instructions on this form and not on the prep you were prescribed.    You will be on a clear liquid diet only which may include coffee , tea, soft drinks, broth(chicken beef or vegetable), popsicles, Jell-O, Gatorade, juice (no orange, grapefruit or V-8).  ®No red or purple dyes and no dairy or non-dairy.    2. At 8:00 am drink the bottle of magnesium citrate.  Drink plenty of fluids in between    3. At    2:00 pm drink first dose or ½ of prep, mix as directed on container/box    Drink plenty of fluids between    4. At   10:00 pm      drink second dose or ½ of prep, mix as directed on container/box    5. If you start to get nauseous while drinking your prep try stepping away for 15-20 min. then resume again at a slower pace.    You may have clear liquids only up to 4 hours before your procedure.  No gum or candy!     You may only take your morning  prescription blood pressure (no diuretic combinations), breathing, seizure, psych or heart medications.     You will need a  to accompany you for your exam. Bus or uber not allowedThe average time spent in our facility is around 2-3 hours.  You are not to drive, operate machinery or make legal decisions the remainder of the day following you procedure.    Please call [phone] if you have questions about any of this information or your bowel movements are not a clearish liquid the morning of your procedure.  If it is after hours or the weekend and you need to reach the doctor or have questions for the office please call [phone].     It is your responsibility to check with your insurance company to determine benefits and out of pocket costs.      Avoid these medications 5-7 days prior to surgery  Please check with your prescribing doctor before stopping any medications  NSAIDS- Advil, Aleve, Motrin, Ibuprofen, Midol, Excedrin, Fiorinal, Marisa-Majestic  (Some cold medications may have these in them)    All herbal medications- iron, vitamin E, fish oil, decongestants (phenylephrine, pseudoephedrine), ginkgo, garlic, ginseng, [place]’s wart    Mobic (meloxicam), Celebrex, Diclofenac (Voltaren), Nambumetone (Relafen), Daypro, Naproxen, Sulindac, Indomethacin, Toradol, Feldine, Salsalate, Etodolac (Lodine),     Viagra, Cialis, Levitra    Aspirin, Plavix (clopidogrel), Effient, Pletal, Coumadin, Pradaxa, Brilinta, Ticlide, Eliquis, (Xaralto- 3 days)      Diet pills -Adipex (phentermine) -2 weeks prior

## 2018-09-13 ENCOUNTER — PREP FOR SURGERY (OUTPATIENT)
Dept: OTHER | Facility: HOSPITAL | Age: 58
End: 2018-09-13

## 2018-09-21 ENCOUNTER — TELEPHONE (OUTPATIENT)
Dept: SURGERY | Facility: CLINIC | Age: 58
End: 2018-09-21

## 2018-09-24 NOTE — TELEPHONE ENCOUNTER
Spoke with pharmacy. Gave them another coupon code, brought price down to $50.00. Patient to call if still to expensive.

## 2018-10-11 ENCOUNTER — ANESTHESIA EVENT (OUTPATIENT)
Dept: PERIOP | Facility: HOSPITAL | Age: 58
End: 2018-10-11

## 2018-10-12 ENCOUNTER — ANESTHESIA (OUTPATIENT)
Dept: PERIOP | Facility: HOSPITAL | Age: 58
End: 2018-10-12

## 2018-10-12 ENCOUNTER — HOSPITAL ENCOUNTER (OUTPATIENT)
Facility: HOSPITAL | Age: 58
Setting detail: HOSPITAL OUTPATIENT SURGERY
Discharge: HOME | End: 2018-10-12
Attending: INTERNAL MEDICINE | Admitting: NURSE ANESTHETIST, CERTIFIED REGISTERED

## 2018-10-12 VITALS
HEIGHT: 66 IN | DIASTOLIC BLOOD PRESSURE: 86 MMHG | OXYGEN SATURATION: 100 % | RESPIRATION RATE: 16 BRPM | BODY MASS INDEX: 24.27 KG/M2 | SYSTOLIC BLOOD PRESSURE: 133 MMHG | WEIGHT: 151 LBS | TEMPERATURE: 98.1 F | HEART RATE: 93 BPM

## 2018-10-12 DIAGNOSIS — Z12.11 COLON CANCER SCREENING: ICD-10-CM

## 2018-10-12 PROCEDURE — 88305 TISSUE EXAM BY PATHOLOGIST: CPT | Performed by: INTERNAL MEDICINE

## 2018-10-12 PROCEDURE — 45380 COLONOSCOPY AND BIOPSY: CPT | Performed by: INTERNAL MEDICINE

## 2018-10-12 PROCEDURE — 25010000002 PROPOFOL 10 MG/ML EMULSION: Performed by: NURSE ANESTHETIST, CERTIFIED REGISTERED

## 2018-10-12 RX ORDER — SODIUM CHLORIDE, SODIUM LACTATE, POTASSIUM CHLORIDE, CALCIUM CHLORIDE 600; 310; 30; 20 MG/100ML; MG/100ML; MG/100ML; MG/100ML
100 INJECTION, SOLUTION INTRAVENOUS CONTINUOUS
Status: CANCELLED | OUTPATIENT
Start: 2018-10-12

## 2018-10-12 RX ORDER — LIDOCAINE HYDROCHLORIDE 20 MG/ML
INJECTION, SOLUTION INFILTRATION; PERINEURAL AS NEEDED
Status: DISCONTINUED | OUTPATIENT
Start: 2018-10-12 | End: 2018-10-12 | Stop reason: SURG

## 2018-10-12 RX ORDER — ONDANSETRON 2 MG/ML
4 INJECTION INTRAMUSCULAR; INTRAVENOUS ONCE AS NEEDED
Status: CANCELLED | OUTPATIENT
Start: 2018-10-12

## 2018-10-12 RX ORDER — SODIUM CHLORIDE 0.9 % (FLUSH) 0.9 %
3 SYRINGE (ML) INJECTION EVERY 12 HOURS SCHEDULED
Status: DISCONTINUED | OUTPATIENT
Start: 2018-10-12 | End: 2018-10-12 | Stop reason: HOSPADM

## 2018-10-12 RX ORDER — LIDOCAINE HYDROCHLORIDE 10 MG/ML
0.5 INJECTION, SOLUTION EPIDURAL; INFILTRATION; INTRACAUDAL; PERINEURAL ONCE AS NEEDED
Status: COMPLETED | OUTPATIENT
Start: 2018-10-12 | End: 2018-10-12

## 2018-10-12 RX ORDER — SODIUM CHLORIDE 0.9 % (FLUSH) 0.9 %
1-10 SYRINGE (ML) INJECTION AS NEEDED
Status: DISCONTINUED | OUTPATIENT
Start: 2018-10-12 | End: 2018-10-12 | Stop reason: HOSPADM

## 2018-10-12 RX ORDER — PROPOFOL 10 MG/ML
VIAL (ML) INTRAVENOUS AS NEEDED
Status: DISCONTINUED | OUTPATIENT
Start: 2018-10-12 | End: 2018-10-12 | Stop reason: SURG

## 2018-10-12 RX ORDER — SODIUM CHLORIDE 9 MG/ML
40 INJECTION, SOLUTION INTRAVENOUS AS NEEDED
Status: DISCONTINUED | OUTPATIENT
Start: 2018-10-12 | End: 2018-10-12 | Stop reason: HOSPADM

## 2018-10-12 RX ORDER — SODIUM CHLORIDE, SODIUM LACTATE, POTASSIUM CHLORIDE, CALCIUM CHLORIDE 600; 310; 30; 20 MG/100ML; MG/100ML; MG/100ML; MG/100ML
9 INJECTION, SOLUTION INTRAVENOUS CONTINUOUS
Status: DISCONTINUED | OUTPATIENT
Start: 2018-10-12 | End: 2018-10-12 | Stop reason: HOSPADM

## 2018-10-12 RX ADMIN — PROPOFOL 100 MG: 10 INJECTION, EMULSION INTRAVENOUS at 09:13

## 2018-10-12 RX ADMIN — LIDOCAINE HYDROCHLORIDE 0.5 ML: 10 INJECTION, SOLUTION EPIDURAL; INFILTRATION; INTRACAUDAL; PERINEURAL at 08:35

## 2018-10-12 RX ADMIN — PROPOFOL 50 MG: 10 INJECTION, EMULSION INTRAVENOUS at 09:35

## 2018-10-12 RX ADMIN — PROPOFOL 50 MG: 10 INJECTION, EMULSION INTRAVENOUS at 09:17

## 2018-10-12 RX ADMIN — PROPOFOL 50 MG: 10 INJECTION, EMULSION INTRAVENOUS at 09:31

## 2018-10-12 RX ADMIN — PROPOFOL 50 MG: 10 INJECTION, EMULSION INTRAVENOUS at 09:29

## 2018-10-12 RX ADMIN — PROPOFOL 50 MG: 10 INJECTION, EMULSION INTRAVENOUS at 09:20

## 2018-10-12 RX ADMIN — LIDOCAINE HYDROCHLORIDE 100 MG: 20 INJECTION, SOLUTION INFILTRATION; PERINEURAL at 09:11

## 2018-10-12 RX ADMIN — PROPOFOL 50 MG: 10 INJECTION, EMULSION INTRAVENOUS at 09:23

## 2018-10-12 RX ADMIN — PROPOFOL 50 MG: 10 INJECTION, EMULSION INTRAVENOUS at 09:26

## 2018-10-12 RX ADMIN — SODIUM CHLORIDE, POTASSIUM CHLORIDE, SODIUM LACTATE AND CALCIUM CHLORIDE 9 ML/HR: 600; 310; 30; 20 INJECTION, SOLUTION INTRAVENOUS at 08:35

## 2018-10-12 NOTE — OP NOTE
Patient Name:  Delilah Morse  YOB: 1960    Date of Procedure:  10/12/2018    Procedure Performed: Colonoscopy     Indications:  Screening, family history of colon cancer, personal history of polyps    Pre-op Diagnosis:   Colon cancer screening [Z12.11]    Post-Op Diagnosis Codes:     * Colon cancer screening [Z12.11]         Staff:  Surgeon(s):  Nika Schaffer MD         Consent: Procedure colonoscopy Indications, risks and procedure were discussed with the patient, including but not limited to, bleeding, infection, possibility of perforation and possible polypectomy. All of the patient's questions were answered, and signed informed consent was obtained and placed on the chart.      Sedation: Sedation was provided by anesthesia.      Estimated Blood Loss: minimal    Specimens:   ID Type Source Tests Collected by Time   A : RECTAL BIOPSY Tissue Large Intestine, Rectum TISSUE PATHOLOGY EXAM Nika Schaffer MD 10/12/2018 0936         Description of Procedure: After excellent sedation a digital rectal examination is performed and a flexible colonoscope was inserted into the rectum passed to the cecum.  The cecum was identified by both the ileocecal valve and the appendiceal orifice.  The overall bowel preparation was good.  Upon withdrawal scope careful examination mucosa was made.  No polyps are noted.  The scope was slowly withdrawn throughout the remainder the colon with careful examination mucosa.  Scope was withdrawn to the rectum and retroflex were internal hemorrhoids are noted.  There is some proctitis noted in the rectal area biopsies are obtained.  Scope was withdrawn out of the patient with no immediate, patient's and she tolerated the procedure well.      Findings: Mild proctitis, internal hemorrhoids    Complications: None        Nika Schaffer MD     Date: 10/12/2018  Time: 9:42 AM

## 2018-10-12 NOTE — ANESTHESIA POSTPROCEDURE EVALUATION
Patient: Delilah Morse    Procedure Summary     Date:  10/12/18 Room / Location:   [facility] /  [facility]    Anesthesia Start:  0911 Anesthesia Stop:  0942    Procedure:  COLONOSCOPY (N/A ) Diagnosis:       Colon cancer screening      (Colon cancer screening [Z12.11])    Surgeon:  Nika Schaffer MD Provider:  Georgia Chand CRNA    Anesthesia Type:  MAC ASA Status:  2          Anesthesia Type: MAC  Last vitals  BP   114/82 (10/12/18 0943)   Temp   98.1 °F (36.7 °C) (10/12/18 0943)   Pulse   90 (10/12/18 0943)   Resp   14 (10/12/18 0943)     SpO2   100 % (10/12/18 0943)     Post Anesthesia Care and Evaluation    Patient location during evaluation: bedside  Patient participation: complete - patient participated  Level of consciousness: awake and alert  Pain score: 0  Pain management: adequate  Airway patency: patent  Anesthetic complications: No anesthetic complications    Cardiovascular status: acceptable  Respiratory status: acceptable  Hydration status: acceptable

## 2018-10-12 NOTE — ANESTHESIA PREPROCEDURE EVALUATION
Anesthesia Evaluation     Patient summary reviewed and Nursing notes reviewed   history of anesthetic complications: PONV  NPO Solid Status: > 8 hours  NPO Liquid Status: > 4 hours           Airway   Mallampati: II  Neck ROM: full  No difficulty expected  Dental - normal exam     Pulmonary - normal exam    breath sounds clear to auscultation  (+) a smoker (1/2 ppd) Current Smoked day of surgery, COPD (last used inhaler 6 months ago) moderate,   Pleural effusion: 12 years ago - Bilat. Pneumothoraxat that time.  Cardiovascular - negative cardio ROS and normal exam    ECG reviewed  Rhythm: regular  Rate: normal        Neuro/Psych- negative ROS  GI/Hepatic/Renal/Endo - negative ROS     Musculoskeletal     Abdominal  - normal exam   Substance History   (+) alcohol use (1/2 bottle of wine per day),      OB/GYN          Other   (+) arthritis                     Anesthesia Plan    ASA 2     MAC     intravenous induction   Anesthetic plan, all risks, benefits, and alternatives have been provided, discussed and informed consent has been obtained with: patient.  Use of blood products discussed with patient  Consented to blood products.

## 2018-10-12 NOTE — H&P
[facility]  Pre-procedure H&P    Chief Complaint: Screening colonoscopy, family history of colon cancer    Delilah Morse is a 57 y.o. female  who is referred by Nika Schaffer MD for a colonoscopy. She is an Asymptomatic person at high risk:  First degree relative with colon cancer who has a history of screening for colon cancer.   She has had colonoscopy 5 years ago without abnormalities..    She denies any change in bowel function, melena, hematochezia or unexplained weight loss.    Past Medical History:   Diagnosis Date   • Adenomyosis     CRAMPS   • Anesthesia complication     WOKE UP DURING LUNG SURGERY FOR SPLIT SECOND DURING  - PLEURODESIS   • COPD (chronic obstructive pulmonary disease) (CMS/HCC)    • History of colon polyps    • Irritable bowel disease    • Leiomyoma    • Pneumonia     YRS AGO X 2   • Pneumothorax on left    • Pneumothorax on right    • PONV (postoperative nausea and vomiting)     VOMITTING       Past Surgical History:   Procedure Laterality Date   • COLONOSCOPY  08/2015   • ENDOMETRIAL ABLATION  2011   • LAPAROSCOPIC ASSISTED VAGINAL HYSTERECTOMY Bilateral 1/16/2017    Procedure: LAPAROSCOPIC ASSISTED VAGINAL HYSTERECTOMY BILATERAL SALPINGECTOMY;  Surgeon: Sariah Young MD;  Location: [facility];  Service:    • LAPAROSCOPIC TUBAL LIGATION     • THORACOSCOPY W/ TALC PLEURODESIS Left 2003   • THORACOSCOPY W/ TALC PLEURODESIS Right 2005   • TONSILLECTOMY         Family History   Problem Relation Age of Onset   • Colon cancer Mother    • Liver disease Mother    • Diabetes Mother    • Heart disease Mother    • Lung disease Father         silica exposure   • No Known Problems Sister    • No Known Problems Brother    • Colon cancer Maternal Grandmother    • Alcohol abuse Brother    • Drug abuse Son    • Breast cancer Neg Hx        Social History     Social History   • Marital status:      Spouse name: N/A   • Number of children: N/A   • Years of  education: N/A     Occupational History   •  [employer]     Social History Main Topics   • Smoking status: Current Every Day Smoker     Packs/day: 0.25     Years: 34.00     Types: Cigarettes   • Smokeless tobacco: Never Used   • Alcohol use 3.0 - 9.0 oz/week     5 - 15 Glasses of wine per week   • Drug use: No   • Sexual activity: Yes     Partners: Male     Birth control/ protection: Surgical     Other Topics Concern   • Not on file     Social History Narrative   • No narrative on file         Current Facility-Administered Medications:   •  lactated ringers infusion, 9 mL/hr, Intravenous, Continuous, Georgia Chand CRNA, Last Rate: 9 mL/hr at 10/12/18 0835, 9 mL/hr at 10/12/18 0835  •  sodium chloride 0.9 % flush 1-10 mL, 1-10 mL, Intravenous, PRN, Georgia Chand CRNA  •  sodium chloride 0.9 % flush 3 mL, 3 mL, Intravenous, Q12H, Georgia Chand CRNA  •  sodium chloride 0.9 % infusion 40 mL, 40 mL, Intravenous, PRN, Georgia Chand CRNA  No current facility-administered medications on file prior to encounter.      Current Outpatient Prescriptions on File Prior to Encounter   Medication Sig Dispense Refill   • gabapentin (NEURONTIN) 400 MG capsule Take 1 capsule by mouth 3 (Three) Times a Day. 90 capsule 5   • Pseudoephedrine-Naproxen Na (ALEVE-D SINUS & COLD PO) Take  by mouth As Needed.     • [DISCONTINUED] naproxen sodium (ALEVE) 220 MG tablet Take 220 mg by mouth 2 (Two) Times a Day.     • STIOLTO RESPIMAT 2.5-2.5 MCG/ACT aerosol solution inhaler      • [DISCONTINUED] CLENPIQ 10-3.5-12 MG-GM -GM/160ML solution Take 1 bottle by mouth 2 (Two) Times a Day. 320 mL 0       No Known Allergies         Vitals:    10/12/18 0833   BP: 126/98   Pulse: 92   Resp: 14   Temp: 98.8 °F (37.1 °C)   SpO2: 98%       Physical Exam:   General: patient awake, alert and cooperative   Eyes: Normal lids and lashes, no scleral icterus   Neck: supple, normal ROM   Skin: warm and dry, not jaundiced   Cardiovascular:  regular rhythm and rate, no murmurs auscultated   Pulm: clear to auscultation bilaterally, regular and unlabored   Abdomen: soft, nontender, nondistended; normal bowel sounds   Extremities: no rash or edema   Psychiatric: Normal mood and behavior    Debilities: None         Assessment/Plan       No diagnosis found.    Schedule for colonoscopy.      Indications, risks and procedure were discussed with the patient, including but not limited to, bleeding, infection, possibility of perforation and possible polypectomy. All of the patient's questions were answered, and signed informed consent was obtained and placed on the chart.         Nika Schaffer MD  [facility]  [unfilled]

## 2018-10-15 LAB
CYTO UR: NORMAL
LAB AP CASE REPORT: NORMAL
LAB AP CLINICAL INFORMATION: NORMAL
PATH REPORT.FINAL DX SPEC: NORMAL
PATH REPORT.GROSS SPEC: NORMAL

## 2019-01-14 ENCOUNTER — OFFICE VISIT (OUTPATIENT)
Dept: FAMILY MEDICINE CLINIC | Facility: CLINIC | Age: 59
End: 2019-01-14

## 2019-01-14 VITALS
WEIGHT: 153 LBS | BODY MASS INDEX: 24.59 KG/M2 | DIASTOLIC BLOOD PRESSURE: 80 MMHG | SYSTOLIC BLOOD PRESSURE: 120 MMHG | HEIGHT: 66 IN | HEART RATE: 98 BPM | OXYGEN SATURATION: 99 %

## 2019-01-14 DIAGNOSIS — R53.82 CHRONIC FATIGUE: Primary | ICD-10-CM

## 2019-01-14 DIAGNOSIS — B00.9 HSV INFECTION: ICD-10-CM

## 2019-01-14 DIAGNOSIS — Z51.81 MEDICATION MONITORING ENCOUNTER: ICD-10-CM

## 2019-01-14 DIAGNOSIS — G89.12 POST-THORACOTOMY PAIN SYNDROME: ICD-10-CM

## 2019-01-14 DIAGNOSIS — Z00.00 ROUTINE ADULT HEALTH MAINTENANCE: ICD-10-CM

## 2019-01-14 DIAGNOSIS — J90: Primary | ICD-10-CM

## 2019-01-14 PROBLEM — M25.571 RIGHT ANKLE PAIN: Status: RESOLVED | Noted: 2018-08-10 | Resolved: 2019-01-14

## 2019-01-14 PROCEDURE — 99213 OFFICE O/P EST LOW 20 MIN: CPT | Performed by: FAMILY MEDICINE

## 2019-01-14 RX ORDER — GABAPENTIN 400 MG/1
400 CAPSULE ORAL 3 TIMES DAILY
Qty: 90 CAPSULE | Refills: 5 | Status: SHIPPED | OUTPATIENT
Start: 2019-01-14 | End: 2019-08-07 | Stop reason: SDUPTHER

## 2019-01-14 NOTE — PROGRESS NOTES
Subjective   Delilah Morse is a 58 y.o. female who is here for   Chief Complaint   Patient presents with   • Follow-up   • Chest Pain   • Med Refill   .     History of Present Illness   F/u on chronic chest wall pain. Ok on gabapentin    Also with COPD due to chemical exposure  Poor exercise tolerance  Non compliant with inhalers as they do not seem to help much    Still with prn small skin blister breakouts every month or 2    The following portions of the patient's history were reviewed and updated as appropriate: allergies, current medications, past family history, past medical history, past social history, past surgical history and problem list.    Review of Systems    Objective   Physical Exam   Cardiovascular: Normal rate.   Pulmonary/Chest: Effort normal. No respiratory distress.   Skin: No rash noted.   Psychiatric: She has a normal mood and affect.   Nursing note and vitals reviewed.      Assessment/Plan   Delilah was seen today for follow-up, chest pain and med refill.    Diagnoses and all orders for this visit:    Pleurisy, serofibrinous  -     gabapentin (NEURONTIN) 400 MG capsule; Take 1 capsule by mouth 3 (Three) Times a Day.    HSV infection    Post-thoracotomy pain syndrome      There are no Patient Instructions on file for this visit.    Medications Discontinued During This Encounter   Medication Reason   • STIOLTO RESPIMAT 2.5-2.5 MCG/ACT aerosol solution inhaler *Therapy completed   • gabapentin (NEURONTIN) 400 MG capsule Reorder        Return in about 6 months (around 7/14/2019) for Annual physical.    Dr. Juarez García  [place], [place]

## 2019-04-01 ENCOUNTER — OFFICE VISIT (OUTPATIENT)
Dept: FAMILY MEDICINE CLINIC | Facility: CLINIC | Age: 59
End: 2019-04-01

## 2019-04-01 VITALS
RESPIRATION RATE: 16 BRPM | WEIGHT: 155 LBS | SYSTOLIC BLOOD PRESSURE: 120 MMHG | TEMPERATURE: 98.3 F | HEART RATE: 99 BPM | HEIGHT: 66 IN | DIASTOLIC BLOOD PRESSURE: 75 MMHG | OXYGEN SATURATION: 99 % | BODY MASS INDEX: 24.91 KG/M2

## 2019-04-01 DIAGNOSIS — H60.391 OTHER INFECTIVE ACUTE OTITIS EXTERNA OF RIGHT EAR: Primary | ICD-10-CM

## 2019-04-01 PROCEDURE — 99213 OFFICE O/P EST LOW 20 MIN: CPT | Performed by: PHYSICIAN ASSISTANT

## 2019-04-01 RX ORDER — NEOMYCIN SULFATE, POLYMYXIN B SULFATE, HYDROCORTISONE 3.5; 10000; 1 MG/ML; [USP'U]/ML; MG/ML
4 SOLUTION/ DROPS AURICULAR (OTIC) 4 TIMES DAILY
Qty: 10 ML | Refills: 1 | Status: SHIPPED | OUTPATIENT
Start: 2019-04-01 | End: 2019-05-13

## 2019-04-01 NOTE — PROGRESS NOTES
"Subjective   Delilah Morse is a 58 y.o. female presents for   Chief Complaint   Patient presents with   • Earache     rt side       History of Present Illness     Delilah is a 58-year-old female who presents with right ear pain for the past of and on for the past 2 months.   States that the ear pain worsen yesterday.  She noticed some swollen glands in her neck. She has had some fevers,chills,fatigue, and headaches.  She has taken some OTC sinus medication with slight relief.  Appetite has been slightly decreased.  Sleep has been normal.      The following portions of the patient's history were reviewed and updated as appropriate: allergies, current medications, past family history, past medical history, past social history, past surgical history and problem list.    Review of Systems   Constitutional: Negative.  Negative for chills, fatigue and fever.   HENT: Positive for ear pain. Negative for congestion, postnasal drip, rhinorrhea, sinus pressure, sinus pain, sneezing and sore throat.    Eyes: Negative.    Respiratory: Negative.  Negative for cough, shortness of breath and wheezing.    Cardiovascular: Negative.  Negative for chest pain, palpitations and leg swelling.   Gastrointestinal: Negative.  Negative for abdominal pain, constipation, diarrhea, nausea and vomiting.   Endocrine: Negative.    Genitourinary: Negative.    Musculoskeletal: Negative.    Skin: Negative.    Allergic/Immunologic: Negative.    Neurological: Negative.  Negative for dizziness, light-headedness and headaches.   Hematological: Negative.    Psychiatric/Behavioral: Negative.  Negative for sleep disturbance.   All other systems reviewed and are negative.        Vitals:    04/01/19 1539   BP: 120/75   Pulse: 99   Resp: 16   Temp: 98.3 °F (36.8 °C)   SpO2: 99%   Weight: 70.3 kg (155 lb)   Height: 167.6 cm (66\")     Wt Readings from Last 3 Encounters:   04/01/19 70.3 kg (155 lb)   01/14/19 69.4 kg (153 lb)   10/12/18 68.5 kg (151 lb) "     BP Readings from Last 3 Encounters:   04/01/19 120/75   01/14/19 120/80   10/12/18 133/86     Social History     Socioeconomic History   • Marital status:      Spouse name: Not on file   • Number of children: Not on file   • Years of education: Not on file   • Highest education level: Not on file   Occupational History     Employer: [employer]   Tobacco Use   • Smoking status: Current Every Day Smoker     Packs/day: 0.25     Years: 34.00     Pack years: 8.50     Types: Cigarettes   • Smokeless tobacco: Never Used   Substance and Sexual Activity   • Alcohol use: Yes     Alcohol/week: 3.0 - 9.0 oz     Types: 5 - 15 Glasses of wine per week   • Drug use: No   • Sexual activity: Yes     Partners: Male     Birth control/protection: Surgical       No Known Allergies    Body mass index is 25.02 kg/m².    Objective   Physical Exam   Constitutional: She is oriented to person, place, and time. Vital signs are normal. She appears well-developed and well-nourished.   HENT:   Head: Normocephalic and atraumatic.   Right Ear: Hearing, tympanic membrane and external ear normal. There is swelling (in right ear canal with erytherma noted with a sore).   Left Ear: Hearing, tympanic membrane, external ear and ear canal normal.   Nose: Nose normal. Right sinus exhibits no maxillary sinus tenderness and no frontal sinus tenderness. Left sinus exhibits no maxillary sinus tenderness and no frontal sinus tenderness.   Mouth/Throat: Uvula is midline, oropharynx is clear and moist and mucous membranes are normal.   Eyes: Conjunctivae, EOM and lids are normal. Pupils are equal, round, and reactive to light.   Neck: Trachea normal and phonation normal. Neck supple. No edema present.   Cardiovascular: Normal rate, regular rhythm, S1 normal, S2 normal, normal heart sounds and normal pulses.   Pulmonary/Chest: Effort normal and breath sounds normal.   Abdominal: Soft. Normal appearance, normal aorta and bowel sounds are normal.  There is no hepatomegaly. There is no tenderness.   Lymphadenopathy:     She has no cervical adenopathy.   Neurological: She is alert and oriented to person, place, and time.   Skin: Skin is warm, dry and intact. Capillary refill takes less than 2 seconds.   Psychiatric: She has a normal mood and affect. Her speech is normal and behavior is normal. Judgment and thought content normal. Cognition and memory are normal.       Assessment/Plan   Delilah was seen today for earache.    Diagnoses and all orders for this visit:    Other infective acute otitis externa of right ear  -     neomycin-polymyxin-hydrocortisone (CORTISPORIN) 1 % solution otic solution; Administer 4 drops to the right ear 4 (Four) Times a Day.    Ms. Delilah Morse was seen in office today and diagnosed with new onset right otitis externa.  I have prescribed generic Cortisporin otic prescription to pharmacy.  She will use as directed.  She will return to office if symptoms do not improve.      ALON Estevez [facility]  [address] [place] [id number]  Dept: [phone]  Dept Fax: [phone]  Loc: [phone]  Loc Fax: [phone]

## 2019-05-13 ENCOUNTER — OFFICE VISIT (OUTPATIENT)
Dept: FAMILY MEDICINE CLINIC | Facility: CLINIC | Age: 59
End: 2019-05-13

## 2019-05-13 VITALS
BODY MASS INDEX: 25.39 KG/M2 | DIASTOLIC BLOOD PRESSURE: 84 MMHG | WEIGHT: 158 LBS | OXYGEN SATURATION: 99 % | SYSTOLIC BLOOD PRESSURE: 132 MMHG | HEIGHT: 66 IN | HEART RATE: 107 BPM

## 2019-05-13 DIAGNOSIS — R53.82 CHRONIC FATIGUE: ICD-10-CM

## 2019-05-13 DIAGNOSIS — B02.9 VARICELLA ZOSTER: ICD-10-CM

## 2019-05-13 DIAGNOSIS — Z00.00 ROUTINE ADULT HEALTH MAINTENANCE: ICD-10-CM

## 2019-05-13 DIAGNOSIS — B02.9 VARICELLA ZOSTER: Primary | ICD-10-CM

## 2019-05-13 DIAGNOSIS — B00.9 HSV INFECTION: ICD-10-CM

## 2019-05-13 PROCEDURE — 99213 OFFICE O/P EST LOW 20 MIN: CPT | Performed by: FAMILY MEDICINE

## 2019-05-13 RX ORDER — OXYCODONE HYDROCHLORIDE AND ACETAMINOPHEN 5; 325 MG/1; MG/1
1 TABLET ORAL EVERY 6 HOURS PRN
Qty: 18 TABLET | Refills: 0 | Status: SHIPPED | OUTPATIENT
Start: 2019-05-13 | End: 2019-08-14

## 2019-05-13 RX ORDER — VALACYCLOVIR HYDROCHLORIDE 1 G/1
1000 TABLET, FILM COATED ORAL 3 TIMES DAILY
Qty: 21 TABLET | Refills: 0 | Status: SHIPPED | OUTPATIENT
Start: 2019-05-13

## 2019-05-13 NOTE — PROGRESS NOTES
Subjective   Delilah Morse is a 58 y.o. female who is here for   Chief Complaint   Patient presents with   • Rash     In rt arm pit     Possible shingles   .     History of Present Illness   Another painful blister rash in her right axilla.    The following portions of the patient's history were reviewed and updated as appropriate: allergies, current medications, past family history, past medical history, past social history, past surgical history and problem list.    Review of Systems    Objective   Physical Exam   Constitutional: She appears distressed.   Skin:        Nursing note and vitals reviewed.      Assessment/Plan   Delilah was seen today for rash.    Diagnoses and all orders for this visit:    Varicella zoster  -     Varicella Zoster Antibodies, IgG / IgM; Future  -     oxyCODONE-acetaminophen (PERCOCET) 5-325 MG per tablet; Take 1 tablet by mouth Every 6 (Six) Hours As Needed for Severe Pain .  -     valACYclovir (VALTREX) 1000 MG tablet; Take 1 tablet by mouth 3 (Three) Times a Day.      There are no Patient Instructions on file for this visit.    Medications Discontinued During This Encounter   Medication Reason   • neomycin-polymyxin-hydrocortisone (CORTISPORIN) 1 % solution otic solution *Therapy completed        No Follow-up on file.    Dr. Juarez García  [place], [place]

## 2019-05-15 LAB
ALBUMIN SERPL-MCNC: 4.9 G/DL (ref 3.5–5.5)
ALBUMIN/GLOB SERPL: 2.2 {RATIO} (ref 1.2–2.2)
ALP SERPL-CCNC: 73 IU/L (ref 39–117)
ALT SERPL-CCNC: 17 IU/L (ref 0–32)
AST SERPL-CCNC: 18 IU/L (ref 0–40)
BILIRUB SERPL-MCNC: 0.3 MG/DL (ref 0–1.2)
BUN SERPL-MCNC: 18 MG/DL (ref 6–24)
BUN/CREAT SERPL: 26 (ref 9–23)
CALCIUM SERPL-MCNC: 9.6 MG/DL (ref 8.7–10.2)
CHLORIDE SERPL-SCNC: 101 MMOL/L (ref 96–106)
CHOLEST SERPL-MCNC: 241 MG/DL (ref 100–199)
CO2 SERPL-SCNC: 22 MMOL/L (ref 20–29)
CREAT SERPL-MCNC: 0.68 MG/DL (ref 0.57–1)
ERYTHROCYTE [DISTWIDTH] IN BLOOD BY AUTOMATED COUNT: 13.2 % (ref 12.3–15.4)
GLOBULIN SER CALC-MCNC: 2.2 G/DL (ref 1.5–4.5)
GLUCOSE SERPL-MCNC: 99 MG/DL (ref 65–99)
HCT VFR BLD AUTO: 40.1 % (ref 34–46.6)
HDLC SERPL-MCNC: 92 MG/DL
HGB BLD-MCNC: 13.5 G/DL (ref 11.1–15.9)
HSV1 IGG SER IA-ACNC: <0.91 INDEX (ref 0–0.9)
HSV2 IGG SER IA-ACNC: 4.76 INDEX (ref 0–0.9)
HSV2 IGG SERPL QL IA: POSITIVE
LDLC SERPL CALC-MCNC: 128 MG/DL (ref 0–99)
MCH RBC QN AUTO: 30.3 PG (ref 26.6–33)
MCHC RBC AUTO-ENTMCNC: 33.7 G/DL (ref 31.5–35.7)
MCV RBC AUTO: 90 FL (ref 79–97)
PLATELET # BLD AUTO: 227 X10E3/UL (ref 150–379)
POTASSIUM SERPL-SCNC: 4 MMOL/L (ref 3.5–5.2)
PROT SERPL-MCNC: 7.1 G/DL (ref 6–8.5)
RBC # BLD AUTO: 4.45 X10E6/UL (ref 3.77–5.28)
SODIUM SERPL-SCNC: 140 MMOL/L (ref 134–144)
TRIGL SERPL-MCNC: 105 MG/DL (ref 0–149)
TSH SERPL DL<=0.005 MIU/L-ACNC: 1.72 UIU/ML (ref 0.45–4.5)
VLDLC SERPL CALC-MCNC: 21 MG/DL (ref 5–40)
VZV IGG SER IA-ACNC: 776 INDEX
VZV IGM SER IA-ACNC: <0.91 INDEX (ref 0–0.9)
WBC # BLD AUTO: 4.5 X10E3/UL (ref 3.4–10.8)

## 2019-07-08 ENCOUNTER — OFFICE VISIT (OUTPATIENT)
Dept: FAMILY MEDICINE CLINIC | Facility: CLINIC | Age: 59
End: 2019-07-08

## 2019-07-08 VITALS
BODY MASS INDEX: 25.07 KG/M2 | RESPIRATION RATE: 16 BRPM | HEART RATE: 106 BPM | SYSTOLIC BLOOD PRESSURE: 122 MMHG | OXYGEN SATURATION: 98 % | DIASTOLIC BLOOD PRESSURE: 80 MMHG | TEMPERATURE: 99.8 F | WEIGHT: 156 LBS | HEIGHT: 66 IN

## 2019-07-08 DIAGNOSIS — J06.9 ACUTE URI: ICD-10-CM

## 2019-07-08 DIAGNOSIS — J32.9 SINUSITIS, UNSPECIFIED CHRONICITY, UNSPECIFIED LOCATION: Primary | ICD-10-CM

## 2019-07-08 PROCEDURE — 99213 OFFICE O/P EST LOW 20 MIN: CPT | Performed by: NURSE PRACTITIONER

## 2019-07-08 RX ORDER — FLUTICASONE PROPIONATE 50 MCG
2 SPRAY, SUSPENSION (ML) NASAL DAILY
COMMUNITY
Start: 2019-07-08 | End: 2020-02-12

## 2019-07-08 RX ORDER — AMOXICILLIN 500 MG/1
1000 CAPSULE ORAL 3 TIMES DAILY
Qty: 42 CAPSULE | Refills: 0 | Status: SHIPPED | OUTPATIENT
Start: 2019-07-08 | End: 2019-07-15

## 2019-07-08 NOTE — PATIENT INSTRUCTIONS

## 2019-07-08 NOTE — PROGRESS NOTES
"Chief Complaint   Patient presents with   • Fever   • Cough       Upper Respiratory Infection: Patient complains of possible sinusitis. Symptoms include congestion, cough and fever. Onset of symptoms was 4 days ago, gradually worsening since that time. She also c/o achiness, congestion, fever 101.5, productive cough with  tenacious colored sputum and purulent nasal discharge for the past 3 days .  She is drinking plenty of fluids. Evaluation to date: none. Treatment to date: antihistamines, cough suppressants and decongestants.  Ill contacts at home or school or work discussed.    The following portions of the patient's history were reviewed and updated as appropriate: allergies, current medications, past family history, past medical history, past social history, past surgical history and problem list.    A comprehensive review of systems was negative except for: Constitutional: positive for chills and fevers  Respiratory: positive for cough and sputum     Vitals:    07/08/19 1448   BP: 122/80   BP Location: Left arm   Patient Position: Sitting   Cuff Size: Adult   Pulse: 106   Resp: 16   Temp: 99.8 °F (37.7 °C)   SpO2: 98%   Weight: 70.8 kg (156 lb)   Height: 167.6 cm (66\")     Gen: Mildly ill appearing, alert  Ears: TM's bulging without redness  Nose:  Congestion  Throat:  Red without exudate, some drainage  Neck: No LAD  Lung: Good air movement, regular RR  Heart: RR without murmur  Skin: No rash      Assessment/Plan   Delilah was seen today for fever and cough.    Diagnoses and all orders for this visit:    Sinusitis, unspecified chronicity, unspecified location    Acute URI    Other orders  -     amoxicillin (AMOXIL) 500 MG capsule; Take 2 capsules by mouth 3 (Three) Times a Day for 7 days.  -     fluticasone (FLONASE) 50 MCG/ACT nasal spray; 2 sprays into the nostril(s) as directed by provider Daily.             Patient Instructions   Sinusitis, Adult  Sinusitis is soreness and swelling (inflammation) of your " sinuses. Sinuses are hollow spaces in the bones around your face. They are located:  · Around your eyes.  · In the middle of your forehead.  · Behind your nose.  · In your cheekbones.    Your sinuses and nasal passages are lined with a stringy fluid (mucus). Mucus normally drains out of your sinuses. Swelling can trap mucus in your sinuses. This lets germs like bacteria or viruses grow, and that leads to infection. Most of the time, sinusitis is caused by a virus.  If bacteria is causing your infection, your doctor may have you wait and see if you get better without antibiotic medicine. You may be prescribed antibiotics if you have:  · A very bad infection.  · A weak disease-fighting (immune) system.    Follow these instructions at home:  Medicines  · Take, use, or apply over-the-counter and prescription medicines only as told by your doctor. These may include nasal sprays.  · If you were prescribed an antibiotic, take it as told by your doctor. Do not stop taking the antibiotic even if you start to feel better.  Hydrate and Humidify  · Drink enough water to keep your pee (urine) pale yellow.  · Use a cool mist humidifier to keep the humidity level in your home above 50%.  · Breathe in steam for 10-15 minutes, 3-4 times a day or as told by your doctor. You can do this in the bathroom while a hot shower is running.  · Try not to spend time in cool or dry air.  Rest  · Rest as much as possible.  · Sleep with your head raised (elevated).  · Make sure to get enough sleep each night.  General instructions  · Put a warm, moist washcloth on your face 3-4 times a day, or as often as told by your doctor. This will help with discomfort.  · Wash your hands often with soap and water. If there is no soap and water, use hand .  · Do not smoke. Avoid being around people who are smoking (secondhand smoke).  · Keep all follow-up visits as told by your doctor. This is important.  Contact a doctor if:  · You have a  fever.  · Your symptoms get worse.  · Your symptoms do not get better within 10 days.  Get help right away if:  · You have a very bad headache.  · You cannot stop throwing up (vomiting).  · You have pain or swelling around your face or eyes.  · You have trouble seeing.  · You feel confused.  · Your neck is stiff.  · You have trouble breathing.  This information is not intended to replace advice given to you by your health care provider. Make sure you discuss any questions you have with your health care provider.  Document Released: 06/05/2009 Document Revised: 06/29/2018 Document Reviewed: 10/12/2016  [employer] Interactive Patient Education © 2019 [employer]        Tylenol or Advil as needed for pain, fever, muscle aches  Plenty of fluids  Hand washing discussed  Off work or school note given if needed.  Warm tea for throat.  Pros and cons of antibiotic use discussed    LUCINDA Arce  Family Practice  [facility]

## 2019-07-15 DIAGNOSIS — Z79.899 HIGH RISK MEDICATION USE: Primary | ICD-10-CM

## 2019-07-17 ENCOUNTER — RESULTS ENCOUNTER (OUTPATIENT)
Dept: FAMILY MEDICINE CLINIC | Facility: CLINIC | Age: 59
End: 2019-07-17

## 2019-07-17 ENCOUNTER — OFFICE VISIT (OUTPATIENT)
Dept: FAMILY MEDICINE CLINIC | Facility: CLINIC | Age: 59
End: 2019-07-17

## 2019-07-17 VITALS
OXYGEN SATURATION: 98 % | BODY MASS INDEX: 25.15 KG/M2 | DIASTOLIC BLOOD PRESSURE: 86 MMHG | WEIGHT: 156.5 LBS | HEIGHT: 66 IN | SYSTOLIC BLOOD PRESSURE: 144 MMHG | HEART RATE: 113 BPM

## 2019-07-17 DIAGNOSIS — K76.9 LIVER DISORDER: ICD-10-CM

## 2019-07-17 DIAGNOSIS — Z86.19 FREQUENT INFECTIONS: ICD-10-CM

## 2019-07-17 DIAGNOSIS — Z77.098 EXPOSURE TO CHEMICAL INHALATION: ICD-10-CM

## 2019-07-17 DIAGNOSIS — Z79.899 HIGH RISK MEDICATION USE: ICD-10-CM

## 2019-07-17 DIAGNOSIS — G89.12 POST-THORACOTOMY PAIN SYNDROME: Primary | ICD-10-CM

## 2019-07-17 PROCEDURE — 99214 OFFICE O/P EST MOD 30 MIN: CPT | Performed by: FAMILY MEDICINE

## 2019-07-17 NOTE — PROGRESS NOTES
Subjective   Delilah Morse is a 58 y.o. female who is here for   Chief Complaint   Patient presents with   • COPD   .     History of Present Illness   COPD: Patient complains of dyspnea, cough, wheezing, weight increase and increased sputum. Symptoms began 10 years ago. Symptoms chronic dyspnea, dyspnea after 1 blocks and dyspnea after 1 flights of stairs does worsen with exertion. Sputum is white in small amounts. Fever has been hot and cold spells. Patient uses 2 pillows at night. Patient can walk 100 feet before resting. Patient currently is not on home oxygen therapy.. Respiratory history: frequent episodes of bronchitis and COPD   Is a smoker  Also extensive occupational inhalation exposures to rubbers and plastics.  Several pneumo thorax surgeries.    Lots of infections  Repeated shingles.    Is now in a left walking boot for plantar fascitis.    Is over due to see her pulm Md  Needs CT and PFT's    Also abd is bloating. Worried about liver dz  Drinks a bottle of wine most nights.        The following portions of the patient's history were reviewed and updated as appropriate: allergies, current medications, past family history, past medical history, past social history, past surgical history and problem list.    Review of Systems    Objective   Physical Exam   Constitutional: She appears well-developed and well-nourished.   Cardiovascular: Normal rate.   Pulmonary/Chest: Effort normal.   Abdominal: She exhibits distension.   Nursing note and vitals reviewed.      Assessment/Plan   Delilah was seen today for copd.    Diagnoses and all orders for this visit:    Post-thoracotomy pain syndrome  -     tiotropium bromide monohydrate (SPIRIVA RESPIMAT) 2.5 MCG/ACT aerosol solution inhaler; Inhale 2 puffs Daily.    Exposure to chemical inhalation  -     tiotropium bromide monohydrate (SPIRIVA RESPIMAT) 2.5 MCG/ACT aerosol solution inhaler; Inhale 2 puffs Daily.    Liver disorder  -     US Liver;  Future    Frequent infections  -     IgG  -     IgM  -     IgA      There are no Patient Instructions on file for this visit.    There are no discontinued medications.     Return in about 1 month (around 8/17/2019) for new medication follow up.    Dr. Juarez García  [place], [place]

## 2019-07-18 LAB
IGA SERPL-MCNC: 90 MG/DL (ref 87–352)
IGG SERPL-MCNC: 682 MG/DL (ref 700–1600)
IGM SERPL-MCNC: 84 MG/DL (ref 26–217)

## 2019-07-22 ENCOUNTER — HOSPITAL ENCOUNTER (OUTPATIENT)
Dept: ULTRASOUND IMAGING | Facility: HOSPITAL | Age: 59
Discharge: HOME | End: 2019-07-22
Admitting: FAMILY MEDICINE

## 2019-07-22 DIAGNOSIS — K76.9 LIVER DISORDER: ICD-10-CM

## 2019-07-22 PROCEDURE — 76705 ECHO EXAM OF ABDOMEN: CPT

## 2019-07-25 ENCOUNTER — TREATMENT (OUTPATIENT)
Dept: PHYSICAL THERAPY | Facility: CLINIC | Age: 59
End: 2019-07-25

## 2019-07-25 DIAGNOSIS — R26.81 DIFFICULTY STANDING: ICD-10-CM

## 2019-07-25 DIAGNOSIS — M72.2 PLANTAR FASCIITIS OF LEFT FOOT: ICD-10-CM

## 2019-07-25 DIAGNOSIS — Z74.09 IMPAIRED FUNCTIONAL MOBILITY, BALANCE, AND ENDURANCE: ICD-10-CM

## 2019-07-25 DIAGNOSIS — R26.2 DIFFICULTY WALKING: ICD-10-CM

## 2019-07-25 DIAGNOSIS — M79.672 LEFT FOOT PAIN: Primary | ICD-10-CM

## 2019-07-25 DIAGNOSIS — M84.375D STRESS FRACTURE OF LEFT FOOT WITH ROUTINE HEALING, SUBSEQUENT ENCOUNTER: ICD-10-CM

## 2019-07-25 PROCEDURE — 97033 APP MDLTY 1+IONTPHRSIS EA 15: CPT | Performed by: PHYSICAL THERAPIST

## 2019-07-25 PROCEDURE — 97035 APP MDLTY 1+ULTRASOUND EA 15: CPT | Performed by: PHYSICAL THERAPIST

## 2019-07-25 PROCEDURE — A4556 ELECTRODES, PAIR: HCPCS | Performed by: PHYSICAL THERAPIST

## 2019-07-25 PROCEDURE — 97162 PT EVAL MOD COMPLEX 30 MIN: CPT | Performed by: PHYSICAL THERAPIST

## 2019-07-25 PROCEDURE — 97110 THERAPEUTIC EXERCISES: CPT | Performed by: PHYSICAL THERAPIST

## 2019-07-25 NOTE — PATIENT INSTRUCTIONS
Access Code: [email or web address]   URL: [email or web address]   Date: 07/25/2019   Prepared by: Camille Terrell     Program Notes   [place] sock     Exercises   Supine Single Leg Ankle Pumps - 30 reps - 1 sets - 2x daily   Long Sitting Plantar Fascia Stretch with Towel - 3 reps - 1 sets - 30 hold - 2x daily   Seated Plantar Fascia Mobilization with Small Ball - 1 reps - 1 sets - 3 minutes hold - 2x daily   Patient Education   Plantar Fasciitis      Patient was educated on findings of evaluation, purpose of treatment, and goals for therapy.  Treatment options discussed and questions answered.  Patient was educated on exercises/self treatment/pain relief techniques.  Pt also instructed on the option of frozen water bottle for plantar fascia mobilization.

## 2019-07-25 NOTE — PROGRESS NOTES
Physical Therapy Initial Evaluation and Plan of Care    Patient: Delilah Morse   : 1960  Diagnosis/ICD-10 Code:  Left foot pain [M79.672]  Referring practitioner: Zhou Edwards MD    Subjective Evaluation    History of Present Illness  Mechanism of injury: Pt reports that she has had heel pain for 3 years of insidious.  Pt reports that she is always on her feet.  There is no carpet in my house and there are 3 flights of stairs.      Saw the MD in  and was place in a boot.  My foot is feeling better in the boot and am able to tolerate walking on flat surfaces.  I don't use my first thing in the morning I have 10/10 pain in my L foot and am also having pain in my R foot. 2 weeks ago I mowed the lawn without my boot and aggravated my heel again.    Pain is worse upon awakening and can barely walk.      Last year I had the same issue with my R foot and was treated with an aircast.     Pt is not using any night splinting at this time.  The MD recommended stretching with a towel after awakening but she has not tried that yet.     Pt reports that she used run prior to her lung surgeries.  Then she started clog dancing in  since she could no longer run. Then began having upper back pain in  and had to stop as it was always aggravating her breathing.      PMH of COPD (2 lung surgeries) and takes gabapentin, osteoporosis          Patient Occupation: works from home at [employer] for [employer]  Current pain ratin (seated at rest in boot)  At worst pain rating: 10  Location: B heels L>L   Quality: sharp and knife-like  Relieving factors: medications (ibruprofen and aleve)  Aggravating factors: ambulation, sleeping, standing and stairs    Diagnostic Tests  X-ray: abnormal (stress fx per pt - pt is unsure of what bone is involved. )    Patient Goals  Patient goals for therapy: decreased pain and independence with ADLs/IADLs             Objective       Tenderness   Left Ankle/Foot   Tenderness in  the medial calcaneus and plantar fascia.     Right Ankle/Foot   Tenderness in the plantar fascia. No tenderness in the medial calcaneus.     Active Range of Motion   Left Ankle/Foot   Dorsiflexion (ke): 5 degrees   Plantar flexion: 38 degrees   Inversion: 28 degrees   Eversion: 20 degrees     Right Ankle/Foot   Dorsiflexion (ke): 8 degrees   Plantar flexion: 42 degrees   Inversion: 28 degrees   Eversion: 18 degrees     Additional Active Range of Motion Details  Pain with PF on left    Strength/Myotome Testing     Left Ankle/Foot   Dorsiflexion: 5  Plantar flexion: 5  Inversion: 5  Eversion: 4+    Tests   Left Ankle/Foot   Positive for calcaneal squeeze.     Right Ankle/Foot   Negative for calcaneal squeeze.     Ambulation   Weight-Bearing Status     Additional Weight-Bearing Status Details  Tall walker boot         Assessment & Plan     Assessment  Impairments: abnormal gait, abnormal or restricted ROM, activity intolerance, impaired balance and pain with function  Assessment details:  Delilah Morse is a pleasant 58 y.o. female that presents presents with signs and symptoms consistent with calcaneal stress fracture . She presents with LEFS 24/80, antalgic gait with tall walker boot, exquisite palp tenderness, painful ev AROM and decreased tolerance to ambulation, stairs and standing.  Pt would benefit from skilled PT services in order to address listed impairments and increase tolerance to normal daily activities including ADLs, work and recreational activities.       Functional Limitations: walking, uncomfortable because of pain, standing and stooping  Goals  Plan Goals: STG In 4 weeks. Pt will:  1. Demonstrate compliance with HEP  2. Pt to report decreased incidence by 50% upon awakening of foot pain after sleeping upon standing  3. Pt is able to ambulate with lace up ASO type brace without pain > 3/10 within clinic.  4. Pt is able to tolerate use of [place] sock for at least 30 min during work day in  order to promote plantar fascia length and reduce tension after prolonged sitting.    LTG in 8 weeks. Pt will:  1. Demonstrate nonpainful L  Ankle eversion  to allow for greater ease and tolerance to prolonged walking on uneven terrain to prepare for mowing lawn  2. Minimal palpable tenderness L calcaneus/plantar fascia to demonstrate improved inflammatory signs.  3. Patient to ascend/descend ten steps to reach second floor bedroom  independently and without pain > 2/10  4. Patient to ambulate on TM for 10 minutes in a good reciprocal gait pattern demonstrating ability to complete basic ADLs, work and household chores  Independently without pain > 2/10  5. Pt to ambulate in a good reciprocal gait pattern without AD device within clinic/home without pain > 2/10      Plan  Therapy options: will be seen for skilled physical therapy services  Planned modality interventions: cryotherapy, ultrasound and iontophoresis  Planned therapy interventions: joint mobilization, stretching, strengthening, neuromuscular re-education, balance/weight-bearing training, manual therapy, home exercise program and gait training  Other planned therapy interventions: taping  Duration in visits: 16  Treatment plan discussed with: patient        Manual Therapy:    -     mins  26422;  Therapeutic Exercise:    12     mins  65232;     Neuromuscular Db:    -    mins  50218;    Therapeutic Activity:     -     mins  17705;     Gait Training:      -     mins  52388;     Ultrasound:     8     mins  56454;    Electrical Stimulation:    -     mins  17216 ( );  Iontophoresis                 8     mins 15851 including pt education      Timed Treatment:   28   mins   Total Treatment:     57   mins    PT SIGNATURE: RENNY Azar License # [id number]  DATE TREATMENT INITIATED: 7/25/2019    Initial Certification  Certification Period: 10/23/2019  I certify that the therapy services are furnished while this patient is under my care.  The  services outlined above are required by this patient, and will be reviewed every 90 days.     PHYSICIAN: Zhou Edwards MD      DATE:     Please sign and return via fax to [phone].. Thank you, [facility].

## 2019-07-29 ENCOUNTER — TREATMENT (OUTPATIENT)
Dept: PHYSICAL THERAPY | Facility: CLINIC | Age: 59
End: 2019-07-29

## 2019-07-29 DIAGNOSIS — M84.375D STRESS FRACTURE OF LEFT FOOT WITH ROUTINE HEALING, SUBSEQUENT ENCOUNTER: ICD-10-CM

## 2019-07-29 DIAGNOSIS — R26.2 DIFFICULTY WALKING: ICD-10-CM

## 2019-07-29 DIAGNOSIS — M79.672 LEFT FOOT PAIN: Primary | ICD-10-CM

## 2019-07-29 DIAGNOSIS — R26.81 DIFFICULTY STANDING: ICD-10-CM

## 2019-07-29 DIAGNOSIS — M72.2 PLANTAR FASCIITIS OF LEFT FOOT: ICD-10-CM

## 2019-07-29 DIAGNOSIS — Z74.09 IMPAIRED FUNCTIONAL MOBILITY, BALANCE, AND ENDURANCE: ICD-10-CM

## 2019-07-29 PROCEDURE — 97035 APP MDLTY 1+ULTRASOUND EA 15: CPT | Performed by: PHYSICAL THERAPIST

## 2019-07-29 PROCEDURE — A4556 ELECTRODES, PAIR: HCPCS | Performed by: PHYSICAL THERAPIST

## 2019-07-29 PROCEDURE — 97110 THERAPEUTIC EXERCISES: CPT | Performed by: PHYSICAL THERAPIST

## 2019-07-29 PROCEDURE — 97033 APP MDLTY 1+IONTPHRSIS EA 15: CPT | Performed by: PHYSICAL THERAPIST

## 2019-07-29 NOTE — PROGRESS NOTES
Physical Therapy Daily Progress Note    Visit # : 2  Delilah Beanmaycoamy reports: I took about 12,000 steps yesterday in my boot and have been watching my 13 month old grandson over the last 4 days and am feeling more soreness today.      Subjective     Objective   See Exercise, Manual, and Modality Logs for complete treatment.       Assessment/Plan  Pt was educated on the importance of resting at home in order to allow tissues to heal.  Pt tolerated ex progression well and was given printed HEP instructions.   Progress per Plan of Care           Manual Therapy:    -     mins  27237;  Therapeutic Exercise:    15     mins  66614;     Neuromuscular Db:    -    mins  89744;    Therapeutic Activity:     -     mins  30216;     Gait Training:      -     mins  23568;     Ultrasound:     8     mins  31681;    Electrical Stimulation:    -     mins  23112 ( );  Iontophoresis                 8     mins 70553  Mech traction                   -     mins 70863  Dry Needling                  -     Min self pay      Timed Treatment:   31   mins   Total Treatment:     31   mins      Chelsea Terrell PT  Physical Therapist  [place] License # [id number]

## 2019-07-29 NOTE — PATIENT INSTRUCTIONS
Access Code: [email or web address]   URL: [email or web address]   Date: 07/29/2019   Prepared by: Camille Terrell     Exercises   Toe Spreading - 20 reps - 1 sets - 3 hold - 2x daily   Seated Toe Towel Scrunches - 20 reps - 1 sets - 2x daily

## 2019-08-01 ENCOUNTER — TREATMENT (OUTPATIENT)
Dept: PHYSICAL THERAPY | Facility: CLINIC | Age: 59
End: 2019-08-01

## 2019-08-01 PROCEDURE — 97033 APP MDLTY 1+IONTPHRSIS EA 15: CPT | Performed by: PHYSICAL THERAPIST

## 2019-08-01 PROCEDURE — 97110 THERAPEUTIC EXERCISES: CPT | Performed by: PHYSICAL THERAPIST

## 2019-08-01 PROCEDURE — 97035 APP MDLTY 1+ULTRASOUND EA 15: CPT | Performed by: PHYSICAL THERAPIST

## 2019-08-01 PROCEDURE — A4556 ELECTRODES, PAIR: HCPCS | Performed by: PHYSICAL THERAPIST

## 2019-08-01 NOTE — PROGRESS NOTES
"Physical Therapy Daily Progress Note    Visit # : 3  Delilah Morse reports: the ankle is better today. Worked from home yesterday, so she was off her feet most of the day and said her ankle felt good    Subjective     Objective       Tenderness   Left Ankle/Foot   Tenderness in the medial calcaneus and plantar fascia.      See Exercise, Manual, and Modality Logs for complete treatment.       Assessment & Plan     Assessment  Assessment details: Pt is tolerating treatment well. Continued to progress pt's strengthening and flexibility exercises. Will continue to increase strength, proprioception, and ROM while decreasing pain and tenderness. Pt stated that she felt \"good\" upon completion of treatment session today. Will continue to see pt 2x/week for treatment and progress towards goals.         Progress per Plan of Care           Manual Therapy:         mins  99120;  Therapeutic Exercise:    27 mins  68400;     Neuromuscular Db:        mins  62408;    Therapeutic Activity:          mins  84113;     Gait Training:           mins  52728;     Ultrasound:     8     mins  28248;    Electrical Stimulation:         mins  83297 ( );  Dry Needling          mins self-pay  ionto patch                       8 mins 06895    Timed Treatment:   43   mins   Total Treatment:     43   mins    Georgia Aguilar, PT  Physical Therapist  "

## 2019-08-05 ENCOUNTER — TREATMENT (OUTPATIENT)
Dept: PHYSICAL THERAPY | Facility: CLINIC | Age: 59
End: 2019-08-05

## 2019-08-05 DIAGNOSIS — R26.2 DIFFICULTY WALKING: ICD-10-CM

## 2019-08-05 DIAGNOSIS — M84.375D STRESS FRACTURE OF LEFT FOOT WITH ROUTINE HEALING, SUBSEQUENT ENCOUNTER: ICD-10-CM

## 2019-08-05 DIAGNOSIS — R26.81 DIFFICULTY STANDING: ICD-10-CM

## 2019-08-05 DIAGNOSIS — Z74.09 IMPAIRED FUNCTIONAL MOBILITY, BALANCE, AND ENDURANCE: ICD-10-CM

## 2019-08-05 DIAGNOSIS — M79.672 LEFT FOOT PAIN: Primary | ICD-10-CM

## 2019-08-05 DIAGNOSIS — M72.2 PLANTAR FASCIITIS OF LEFT FOOT: ICD-10-CM

## 2019-08-05 PROCEDURE — 97035 APP MDLTY 1+ULTRASOUND EA 15: CPT | Performed by: PHYSICAL THERAPIST

## 2019-08-05 PROCEDURE — 97033 APP MDLTY 1+IONTPHRSIS EA 15: CPT | Performed by: PHYSICAL THERAPIST

## 2019-08-05 PROCEDURE — 97110 THERAPEUTIC EXERCISES: CPT | Performed by: PHYSICAL THERAPIST

## 2019-08-05 PROCEDURE — A4556 ELECTRODES, PAIR: HCPCS | Performed by: PHYSICAL THERAPIST

## 2019-08-05 NOTE — PROGRESS NOTES
Physical Therapy Daily Progress Note        Patient: Delilah Morse   : 1960  Diagnosis/ICD-10 Code:  Left foot pain [M79.672]  Referring practitioner: Zhou Edwards MD  Date of Initial Visit: Type: THERAPY  Noted: 2019  Today's Date: 2019  Patient seen for 4 sessions         Delilah Morse reports: her ankle is doing much better and she doesn't think she needs the boot anymore.  She reported pain only with the plantar fasciaitis and she didn't stretch this AM because she woke up too late. She reported nothing hurts except turning her ankle out.        Subjective     Objective   See Exercise, Manual, and Modality Logs for complete treatment.     5/10 tenderness medial calcaneous plantar surface      Assessment/Plan  Significant cues required throughout the session to decrease hip substitution. Pt able to correct with cues however difficulty maintaining due to speed and fatigue.  Decreased tenderness this date compared to previous sessions. She was able to progress strengthening this date without complaints. Continued with US and Ionto to decrease pain and symptoms.  Continue to progress strengthening and ROM within tolerance and initiate standing exercises without boot once cleared by MD.    Progress per Plan of Care           Manual Therapy:         mins  04634;  Therapeutic Exercise:    30     mins  58922;     Neuromuscular Db:        mins  99404;    Therapeutic Activity:          mins  61647;     Gait Training:           mins  00720;     Ultrasound:     8     mins  76023;    Electrical Stimulation:         mins  90471 ( );  Dry Needling          mins self-pay  Ionto    8 mins    Timed Treatment:   46   mins   Total Treatment:     46   mins    Narda Canchola PTA  Physical Therapist Assistant

## 2019-08-07 DIAGNOSIS — J90: ICD-10-CM

## 2019-08-07 RX ORDER — GABAPENTIN 400 MG/1
CAPSULE ORAL
Qty: 90 CAPSULE | Refills: 5 | Status: SHIPPED | OUTPATIENT
Start: 2019-08-07 | End: 2020-02-12 | Stop reason: SDUPTHER

## 2019-08-08 ENCOUNTER — TREATMENT (OUTPATIENT)
Dept: PHYSICAL THERAPY | Facility: CLINIC | Age: 59
End: 2019-08-08

## 2019-08-08 DIAGNOSIS — Z74.09 IMPAIRED FUNCTIONAL MOBILITY, BALANCE, AND ENDURANCE: ICD-10-CM

## 2019-08-08 DIAGNOSIS — M79.672 LEFT FOOT PAIN: Primary | ICD-10-CM

## 2019-08-08 DIAGNOSIS — M72.2 PLANTAR FASCIITIS OF LEFT FOOT: ICD-10-CM

## 2019-08-08 DIAGNOSIS — R26.81 DIFFICULTY STANDING: ICD-10-CM

## 2019-08-08 DIAGNOSIS — M84.375D STRESS FRACTURE OF LEFT FOOT WITH ROUTINE HEALING, SUBSEQUENT ENCOUNTER: ICD-10-CM

## 2019-08-08 DIAGNOSIS — R26.2 DIFFICULTY WALKING: ICD-10-CM

## 2019-08-08 PROCEDURE — 97110 THERAPEUTIC EXERCISES: CPT | Performed by: PHYSICAL THERAPIST

## 2019-08-08 PROCEDURE — 97035 APP MDLTY 1+ULTRASOUND EA 15: CPT | Performed by: PHYSICAL THERAPIST

## 2019-08-08 PROCEDURE — 97033 APP MDLTY 1+IONTPHRSIS EA 15: CPT | Performed by: PHYSICAL THERAPIST

## 2019-08-08 NOTE — PROGRESS NOTES
Physical Therapy Daily Progress Note      Visit # : 5  Delilah Morse reports: She goes to MD next week and hopes she can start walking without her boot.  States she is a 3/10 pain during the day and is very happy with her progress thus far.    Subjective     Objective       Tenderness   Left Ankle/Foot   Tenderness in the medial calcaneus and plantar fascia.     Additional Tenderness Details  Insertion of the plantar fascia at the medial calcaneus on the left LE     See Exercise, Manual, and Modality Logs for complete treatment.       Assessment & Plan     Assessment  Assessment details: Pt is tolerating therapy well and continues to follow HEP. Pt presented to the clinic wearing her boot and states that she wears it all day per MD orders.  Pt was able to increase her repetitions during the strengthening and proprioception exercises. Will continue to see pt 2x/week for treatment and progress towards goals.          Progress per Plan of Care           Manual Therapy:         mins  12632;  Therapeutic Exercise:    30     mins  16648;     Neuromuscular Db:        mins  29892;    Therapeutic Activity:          mins  16480;     Gait Training:           mins  17938;     Ultrasound:    8      mins  79199;    Electrical Stimulation:         mins  44269 ( );  Dry Needling          mins self-pay  ionto patch                   8       mins [id number]    Timed Treatment:   46 mins   Total Treatment:     47   mins    Georgia Aguilar, PT  Physical Therapist

## 2019-08-12 ENCOUNTER — TREATMENT (OUTPATIENT)
Dept: PHYSICAL THERAPY | Facility: CLINIC | Age: 59
End: 2019-08-12

## 2019-08-12 DIAGNOSIS — R26.81 DIFFICULTY STANDING: ICD-10-CM

## 2019-08-12 DIAGNOSIS — Z74.09 IMPAIRED FUNCTIONAL MOBILITY, BALANCE, AND ENDURANCE: ICD-10-CM

## 2019-08-12 DIAGNOSIS — M72.2 PLANTAR FASCIITIS OF LEFT FOOT: ICD-10-CM

## 2019-08-12 DIAGNOSIS — M79.672 LEFT FOOT PAIN: Primary | ICD-10-CM

## 2019-08-12 DIAGNOSIS — M84.375D STRESS FRACTURE OF LEFT FOOT WITH ROUTINE HEALING, SUBSEQUENT ENCOUNTER: ICD-10-CM

## 2019-08-12 DIAGNOSIS — R26.2 DIFFICULTY WALKING: ICD-10-CM

## 2019-08-12 PROCEDURE — 97110 THERAPEUTIC EXERCISES: CPT | Performed by: PHYSICAL THERAPIST

## 2019-08-12 NOTE — PROGRESS NOTES
"   Physical Therapy Daily Progress Note        Patient: Delilah Morse   : 1960  Diagnosis/ICD-10 Code:  Left foot pain [M79.672]  Referring practitioner: Zhou Edwards MD  Date of Initial Visit: Type: THERAPY  Noted: 2019  Today's Date: 2019  Patient seen for 6 sessions         Delilah Morse reports: her ankle is ok. It still hurts but it is better. She rated the pain as 3/10.  She reported she was really sore after last session and after walking in the grass this weekend (without boot.)         Subjective   Current Pain=3/10--\"It hurts when I'm on it.\"  Pain at worst=5/10  Pain at Best=0/10--sitting    Objective       Tenderness     Additional Tenderness Details  Plantar surface of calcaneous especially medially    Active Range of Motion   Left Ankle/Foot   Dorsiflexion (ke): 8 degrees   Plantar flexion: 42 degrees   Inversion: 30 degrees   Eversion: 14 degrees     Strength/Myotome Testing     Left Ankle/Foot   Dorsiflexion: 5  Plantar flexion: 5  Inversion: 4+  Eversion: 4+     See Exercise, Manual, and Modality Logs for complete treatment.       Assessment/Plan  Pt reported increased pain/soreness after last session thus maintained exercises due to multiple progressions made in previous session.  She also reported increased pain with attempts to amb in grass without boot.  Pt demonstrated good strength in all planes however may benefit from additional strengthening and proprioceptive training while in static/dynamic weightbearing positions especially due to increased symptoms with attempts at amb without boot.  Plan to progress exercises as tolerated towards goals and previous level of function.    Progress per Plan of Care           Manual Therapy:         mins  00532;  Therapeutic Exercise:    30     mins  89486;     Neuromuscular Db:        mins  50983;    Therapeutic Activity:          mins  02196;     Gait Training:           mins  39560;     Ultrasound:          mins  " 91460;    Electrical Stimulation:         mins  03536 ( );  Dry Needling          mins self-pay    Timed Treatment:   30   mins   Total Treatment:     50   mins    Narda Canchola PTA  Physical Therapist Assistant

## 2019-08-14 ENCOUNTER — OFFICE VISIT (OUTPATIENT)
Dept: FAMILY MEDICINE CLINIC | Facility: CLINIC | Age: 59
End: 2019-08-14

## 2019-08-14 VITALS
DIASTOLIC BLOOD PRESSURE: 94 MMHG | SYSTOLIC BLOOD PRESSURE: 156 MMHG | WEIGHT: 154 LBS | HEIGHT: 66 IN | HEART RATE: 96 BPM | OXYGEN SATURATION: 99 % | BODY MASS INDEX: 24.75 KG/M2

## 2019-08-14 DIAGNOSIS — G89.12 POST-THORACOTOMY PAIN SYNDROME: ICD-10-CM

## 2019-08-14 DIAGNOSIS — G89.29 CHRONIC HEEL PAIN, LEFT: ICD-10-CM

## 2019-08-14 DIAGNOSIS — M79.672 CHRONIC HEEL PAIN, LEFT: ICD-10-CM

## 2019-08-14 DIAGNOSIS — K76.0 FATTY LIVER: Primary | ICD-10-CM

## 2019-08-14 PROCEDURE — 99213 OFFICE O/P EST LOW 20 MIN: CPT | Performed by: FAMILY MEDICINE

## 2019-08-14 NOTE — PATIENT INSTRUCTIONS
Results for orders placed or performed in visit on 07/17/19   IgG   Result Value Ref Range    IgG 682 (L) 700 - 1,600 mg/dL   IgM   Result Value Ref Range    IgM 84 26 - 217 mg/dL   IgA   Result Value Ref Range    IgA 90 87 - 352 mg/dL         ULTRASOUND ABDOMEN, LIMITED, 07/22/2019     HISTORY:  Bloating and epigastric pain one year     TECHNIQUE:  Grayscale ultrasound imaging of the right upper quadrant was performed.     FINDINGS:  Visualized pancreas is unremarkable. The liver measures approximately 18  cm and has a coarsened echotexture with increased echogenicity compared  to the right kidney without evidence of ascites or focal liver mass.  Unremarkable gallbladder. Extrahepatic common bile duct measures  approximately 2-3 mm. The right kidney is nonobstructed and measures  11.1 cm.     IMPRESSION:     1. The liver demonstrates ultrasound features suggestive of hepatic  steatosis and there is nonspecific coarsening of the hepatic  echotexture.  2. Otherwise negative.     This report was finalized on 7/22/2019 8:32 AM by Dr. Damien Capps MD

## 2019-08-14 NOTE — PROGRESS NOTES
Subjective   Delilah Morse is a 58 y.o. female who is here for   Chief Complaint   Patient presents with   • Liver Follow-up     ultra sound    .     History of Present Illness   Has fatty liver on US.  LFT's are ok  Reviewed IG levels all ok    Going to PT and Dr Edwards for left foot issues      Will see her pulm md in october    The following portions of the patient's history were reviewed and updated as appropriate: allergies, current medications, past family history, past medical history, past social history, past surgical history and problem list.    Review of Systems    Objective   Physical Exam   Constitutional: She appears well-developed and well-nourished.   Cardiovascular: Normal rate.   Pulmonary/Chest: No respiratory distress.   Nursing note and vitals reviewed.      Assessment/Plan   Delilah was seen today for liver follow-up.    Diagnoses and all orders for this visit:    Fatty liver    Post-thoracotomy pain syndrome    Chronic heel pain, left    reviewed results  Skinny filled last week    Patient Instructions     Results for orders placed or performed in visit on 07/17/19   IgG   Result Value Ref Range    IgG 682 (L) 700 - 1,600 mg/dL   IgM   Result Value Ref Range    IgM 84 26 - 217 mg/dL   IgA   Result Value Ref Range    IgA 90 87 - 352 mg/dL         ULTRASOUND ABDOMEN, LIMITED, 07/22/2019     HISTORY:  Bloating and epigastric pain one year     TECHNIQUE:  Grayscale ultrasound imaging of the right upper quadrant was performed.     FINDINGS:  Visualized pancreas is unremarkable. The liver measures approximately 18  cm and has a coarsened echotexture with increased echogenicity compared  to the right kidney without evidence of ascites or focal liver mass.  Unremarkable gallbladder. Extrahepatic common bile duct measures  approximately 2-3 mm. The right kidney is nonobstructed and measures  11.1 cm.     IMPRESSION:     1. The liver demonstrates ultrasound features suggestive of hepatic  steatosis  and there is nonspecific coarsening of the hepatic  echotexture.  2. Otherwise negative.     This report was finalized on 7/22/2019 8:32 AM by Dr. Damien Capps MD      Medications Discontinued During This Encounter   Medication Reason   • oxyCODONE-acetaminophen (PERCOCET) 5-325 MG per tablet *Therapy completed        Return in about 6 months (around 2/14/2020).    Dr. Juarez García  [place], [place]

## 2019-10-28 ENCOUNTER — OFFICE VISIT (OUTPATIENT)
Dept: FAMILY MEDICINE CLINIC | Facility: CLINIC | Age: 59
End: 2019-10-28

## 2019-10-28 VITALS
SYSTOLIC BLOOD PRESSURE: 124 MMHG | WEIGHT: 150 LBS | DIASTOLIC BLOOD PRESSURE: 80 MMHG | HEART RATE: 87 BPM | OXYGEN SATURATION: 99 % | HEIGHT: 66 IN | BODY MASS INDEX: 24.11 KG/M2

## 2019-10-28 DIAGNOSIS — Z12.39 SCREENING FOR BREAST CANCER: ICD-10-CM

## 2019-10-28 DIAGNOSIS — Z00.00 ROUTINE ADULT HEALTH MAINTENANCE: Primary | ICD-10-CM

## 2019-10-28 PROCEDURE — 99396 PREV VISIT EST AGE 40-64: CPT | Performed by: FAMILY MEDICINE

## 2019-10-28 NOTE — PROGRESS NOTES
"  Chief Complaint   Patient presents with   • Annual Exam     is fasting for labs        Subjective   Delilah Morse is a 58 y.o. female and is here for a yearly physical exam. The patient reports problems - on going pulm issues, just est with [facility].    Do you take any herbs or supplements that were not prescribed by a doctor? yes. If so, these will be added to active medication list.    The following portions of the patient's history were reviewed and updated as appropriate: allergies, current medications, past family history, past medical history, past social history, past surgical history and problem list.    Social and Family and Surgical History reviewed and updated today, see Rooming tab.    Health History, Preventive Measures and Vaccination flow sheets reviewed and updated today.    Patient's current medical chart in Epic; including previous office notes, imaging, labs, specialist's evaluation either in notes or in Media tab reviewed today.    Other pertinent medical information also reviewed thru Care Everywhere function is also reviewed today.    Review of Systems  Review of Systems  A comprehensive review of systems was negative except for: Respiratory: positive for chronic bronchitis, pleurisy/chest pain and wheezing    Vitals:    10/28/19 1341   BP: 124/80   BP Location: Left arm   Patient Position: Sitting   Cuff Size: Adult   Pulse: 87   SpO2: 99%   Weight: 68 kg (150 lb)   Height: 167.6 cm (66\")       General Appearance:  Alert, cooperative, no distress, appears stated age   Head:  Normocephalic, without obvious abnormality, atraumatic   Eyes:  PERRL, conjunctiva/corneas clear, EOM's intact.   Ears:  Normal TM's and external ear canals, both ears   Nose: Nares normal, septum midline, mucosa normal, no drainage or sinus tenderness   Throat: Lips, mucosa, and tongue normal; teeth and gums normal   Neck: Supple, symmetrical, trachea midline, no adenopathy;   thyroid: No " enlargement/tenderness/nodules; no carotid  bruit   Back:  Symmetric, no curvature, ROM normal, no CVA tenderness   Lungs:  Mild wheeze     Chest wall:  No tenderness or deformity   Heart:  Regular rate and rhythm, S1 and S2 normal, no murmur, rub or gallop   Abdomen:  Soft, non-tender, bowel sounds active all four quadrants,   no masses, no organomegaly   Rectal:        Extremities: Extremities normal, atraumatic, no cyanosis or edema   Pulses: 2+ and symmetric all extremities   Skin: Skin color, texture, turgor normal, no rashes or lesions   Lymph nodes: Cervical, supraclavicular, and axillary nodes normal   Neurologic: CNII-XII intact. Normal strength, sensation and reflexes   throughout          Results for orders placed or performed in visit on 07/17/19   IgG   Result Value Ref Range    IgG 682 (L) 700 - 1,600 mg/dL   IgM   Result Value Ref Range    IgM 84 26 - 217 mg/dL   IgA   Result Value Ref Range    IgA 90 87 - 352 mg/dL     Assessment/Plan   Healthy female exam.  Delilah was seen today for annual exam.    Diagnoses and all orders for this visit:    Routine adult health maintenance    Screening for breast cancer  -     Mammo Screening Bilateral With CAD; Future      1. Labs reviewed  2. Patient Counseling:  --Nutrition: Stressed importance of moderation in sodium/caffeine intake, saturated fat and cholesterol.  Discussed caloric balance, sufficient intake of fresh fruits, vegetables, fiber, calcium, iron.  --Discussed the new recommendation against daily use of baby aspirin for primary prevention in low risk patients.  --Exercise: Stressed the importance of regular exercise.   --Substance Abuse: Discussed cessation/primary prevention of tobacco, alcohol, or other drug use; driving or other dangerous activities under the influence. Still smoking     --Dental health: Discussed importance of regular tooth brushing, flossing, and dental visits.  -- suggested having eyes and vision checked if needed or past  due.  --Immunizations reviewed.  --Discussed benefits of screening colonoscopy.utd  3. Discussed the patient's BMI with her.  The BMI is in the acceptable range  4. Follow up in 4 months    There are no Patient Instructions on file for this visit.    Medications Discontinued During This Encounter   Medication Reason   • tiotropium bromide monohydrate (SPIRIVA RESPIMAT) 2.5 MCG/ACT aerosol solution inhaler Discontinued by another clinician   • Tiotropium Bromide Monohydrate (SPIRIVA HANDIHALER IN) *Therapy completed        Dr. Juarez García MD  [place], [place]  [facility]

## 2019-12-06 ENCOUNTER — HOSPITAL ENCOUNTER (OUTPATIENT)
Dept: MAMMOGRAPHY | Facility: HOSPITAL | Age: 59
Discharge: HOME | End: 2019-12-06
Admitting: FAMILY MEDICINE

## 2019-12-06 DIAGNOSIS — Z12.39 SCREENING FOR BREAST CANCER: ICD-10-CM

## 2019-12-06 PROCEDURE — 77067 SCR MAMMO BI INCL CAD: CPT

## 2019-12-06 PROCEDURE — 77063 BREAST TOMOSYNTHESIS BI: CPT

## 2020-02-12 ENCOUNTER — OFFICE VISIT (OUTPATIENT)
Dept: FAMILY MEDICINE CLINIC | Facility: CLINIC | Age: 60
End: 2020-02-12

## 2020-02-12 VITALS
SYSTOLIC BLOOD PRESSURE: 122 MMHG | BODY MASS INDEX: 24.91 KG/M2 | HEIGHT: 66 IN | DIASTOLIC BLOOD PRESSURE: 82 MMHG | HEART RATE: 95 BPM | OXYGEN SATURATION: 96 % | WEIGHT: 155 LBS

## 2020-02-12 DIAGNOSIS — J90: ICD-10-CM

## 2020-02-12 DIAGNOSIS — G89.12 POST-THORACOTOMY PAIN SYNDROME: Primary | ICD-10-CM

## 2020-02-12 DIAGNOSIS — Z00.00 ROUTINE ADULT HEALTH MAINTENANCE: Primary | ICD-10-CM

## 2020-02-12 DIAGNOSIS — K76.0 FATTY LIVER: ICD-10-CM

## 2020-02-12 DIAGNOSIS — Z51.81 MEDICATION MONITORING ENCOUNTER: ICD-10-CM

## 2020-02-12 PROBLEM — R91.1 PULMONARY NODULE: Status: ACTIVE | Noted: 2019-12-16

## 2020-02-12 PROCEDURE — 99213 OFFICE O/P EST LOW 20 MIN: CPT | Performed by: FAMILY MEDICINE

## 2020-02-12 RX ORDER — GABAPENTIN 400 MG/1
400 CAPSULE ORAL 3 TIMES DAILY
Qty: 90 CAPSULE | Refills: 5 | Status: SHIPPED | OUTPATIENT
Start: 2020-02-12 | End: 2020-08-14 | Stop reason: SDUPTHER

## 2020-02-12 NOTE — PROGRESS NOTES
Subjective   Delilah Morse is a 59 y.o. female who is here for   Chief Complaint   Patient presents with   • Med Refill   .     History of Present Illness   COPD: Patient complains of dyspnea and cough. Symptoms began several years ago. Symptoms chronic dyspnea and non-productive cough does worsen with exertion. Sputum is clear in small amounts. Fever has been sweats. Patient uses 2 pillows at night. Patient can walk 200 feet before resting. Patient currently is not on home oxygen therapy.. Respiratory history: COPD and chronic chest wall pain  sofi works very well. has seen her new pulm MD. we reviewed those notes      The following portions of the patient's history were reviewed and updated as appropriate: allergies, current medications, past family history, past medical history, past social history, past surgical history and problem list.    Review of Systems    Objective   Physical Exam   Constitutional: She appears well-developed and well-nourished.   HENT:   Mouth/Throat: Oropharynx is clear and moist.   Cardiovascular: Normal rate.   Pulmonary/Chest: Effort normal. No respiratory distress. She has no wheezes. She exhibits no tenderness.   Neurological: She is alert.   Psychiatric: She has a normal mood and affect.   Nursing note and vitals reviewed.      Assessment/Plan   Delilah was seen today for med refill.    Diagnoses and all orders for this visit:    Post-thoracotomy pain syndrome    Pleurisy, serofibrinous  -     gabapentin (NEURONTIN) 400 MG capsule; Take 1 capsule by mouth 3 (Three) Times a Day.      There are no Patient Instructions on file for this visit.    Medications Discontinued During This Encounter   Medication Reason   • gabapentin (NEURONTIN) 400 MG capsule Reorder   • fluticasone (FLONASE) 50 MCG/ACT nasal spray *Therapy completed        No follow-ups on file.    Dr. Juarez García  [facility]  [place], [place]

## 2020-03-16 ENCOUNTER — OFFICE VISIT (OUTPATIENT)
Dept: FAMILY MEDICINE CLINIC | Facility: CLINIC | Age: 60
End: 2020-03-16

## 2020-03-16 ENCOUNTER — HOSPITAL ENCOUNTER (OUTPATIENT)
Dept: GENERAL RADIOLOGY | Facility: HOSPITAL | Age: 60
Discharge: HOME | End: 2020-03-16
Admitting: NURSE PRACTITIONER

## 2020-03-16 VITALS
WEIGHT: 159 LBS | TEMPERATURE: 98.1 F | HEIGHT: 66 IN | OXYGEN SATURATION: 95 % | RESPIRATION RATE: 16 BRPM | DIASTOLIC BLOOD PRESSURE: 80 MMHG | HEART RATE: 96 BPM | BODY MASS INDEX: 25.55 KG/M2 | SYSTOLIC BLOOD PRESSURE: 136 MMHG

## 2020-03-16 DIAGNOSIS — R07.81 RIB PAIN ON RIGHT SIDE: Primary | ICD-10-CM

## 2020-03-16 DIAGNOSIS — R91.8 ABNORMALITY OF LUNG ON CXR: ICD-10-CM

## 2020-03-16 PROCEDURE — 71101 X-RAY EXAM UNILAT RIBS/CHEST: CPT

## 2020-03-16 PROCEDURE — 96372 THER/PROPH/DIAG INJ SC/IM: CPT | Performed by: NURSE PRACTITIONER

## 2020-03-16 PROCEDURE — 99213 OFFICE O/P EST LOW 20 MIN: CPT | Performed by: NURSE PRACTITIONER

## 2020-03-16 RX ORDER — METHYLPREDNISOLONE ACETATE 80 MG/ML
60 INJECTION, SUSPENSION INTRA-ARTICULAR; INTRALESIONAL; INTRAMUSCULAR; SOFT TISSUE ONCE
Status: COMPLETED | OUTPATIENT
Start: 2020-03-16 | End: 2020-03-16

## 2020-03-16 RX ORDER — KETOROLAC TROMETHAMINE 30 MG/ML
30 INJECTION, SOLUTION INTRAMUSCULAR; INTRAVENOUS ONCE
Status: COMPLETED | OUTPATIENT
Start: 2020-03-16 | End: 2020-03-16

## 2020-03-16 RX ADMIN — KETOROLAC TROMETHAMINE 30 MG: 30 INJECTION, SOLUTION INTRAMUSCULAR; INTRAVENOUS at 16:37

## 2020-03-16 RX ADMIN — METHYLPREDNISOLONE ACETATE 60 MG: 80 INJECTION, SUSPENSION INTRA-ARTICULAR; INTRALESIONAL; INTRAMUSCULAR; SOFT TISSUE at 16:41

## 2020-03-16 NOTE — PATIENT INSTRUCTIONS
Chest Wall Pain  Chest wall pain is pain in or around the bones and muscles of your chest. Chest wall pain may be caused by:  · An injury.  · Coughing a lot.  · Using your chest and arm muscles too much.  Sometimes, the cause may not be known. This pain may take a few weeks or longer to get better.  Follow these instructions at home:  Managing pain, stiffness, and swelling  If told, put ice on the painful area:  · Put ice in a plastic bag.  · Place a towel between your skin and the bag.  · Leave the ice on for 20 minutes, 2-3 times a day.    Activity  · Rest as told by your doctor.  · Avoid doing things that cause pain. This includes lifting heavy items.  · Ask your doctor what activities are safe for you.  General instructions    · Take over-the-counter and prescription medicines only as told by your doctor.  · Do not use any products that contain nicotine or tobacco, such as cigarettes, e-cigarettes, and chewing tobacco. If you need help quitting, ask your doctor.  · Keep all follow-up visits as told by your doctor. This is important.  Contact a doctor if:  · You have a fever.  · Your chest pain gets worse.  · You have new symptoms.  Get help right away if:  · You feel sick to your stomach (nauseous) or you throw up (vomit).  · You feel sweaty or light-headed.  · You have a cough with mucus from your lungs (sputum) or you cough up blood.  · You are short of breath.  These symptoms may be an emergency. Do not wait to see if the symptoms will go away. Get medical help right away. Call your local emergency services (911 in the [place] Do not drive yourself to the hospital.  Summary  · Chest wall pain is pain in or around the bones and muscles of your chest.  · It may be treated with ice, rest, and medicines. Your condition may also get better if you avoid doing things that cause pain.  · Contact a doctor if you have a fever, chest pain that gets worse, or new symptoms.  · Get help right away if you feel light-headed  or you get short of breath. These symptoms may be an emergency.  This information is not intended to replace advice given to you by your health care provider. Make sure you discuss any questions you have with your health care provider.  Document Released: 06/05/2009 Document Revised: 06/20/2019 Document Reviewed: 06/20/2019  [employer] Patient Education © 2020 [employer]

## 2020-03-16 NOTE — PROGRESS NOTES
"Subjective      Chief Complaint   Patient presents with   • Back Pain     thoracic   • Fall     2/28/20       Delilah Morse is a 59 y.o. female who presents for evaluation of low back pain. The patient has had no prior back problems. Symptoms have been present for 2 weeks and are unchanged.  Onset was related to / precipitated by a fall. The pain is located in the right rib and does not radiate. The pain is described as achy and occurs intermittently. She rates her pain as a 3 on a scale of 0-10. Symptoms are exacerbated by coughing, deep breathing and lying down. Symptoms are improved by change in body position, narcotic pain medications and rest. She has also tried nothing which provided no symptom relief. She has no other symptoms associated with the back pain. The patient has no \"red flag\" history indicative of complicated back pain.  Seen at urgent care on March 1, 2020 due to right sided pain after fall.  Patient states she was sent home with pain meds and muscle relaxants which she has been taking with some relief.  There was noted abnormality on previous chest x-ray with rib detail with possible nodule however recommended repeat testing with nipple markers.    The following portions of the patient's history were reviewed and updated as appropriate: allergies, current medications, past family history, past medical history, past social history, past surgical history and problem list.    Review of Systems  A comprehensive review of systems was negative except for: Musculoskeletal: positive for right rib pain    Objective   Inspection and palpation: inspection of back is normal, No redness, brusing, or crepitus to right lateral rib area or right thoracic back.  .    Assessment/Plan   Non-specific right thoracic back pain and rib pain   Repeat CXR with right rib detail today with nipple markers.   Will call with results.    Natural history and expected course discussed. Questions answered.  Short (2-4 day) " period of relative rest recommended until acute symptoms improve.  Ice to affected area as needed for local pain relief.  OTC analgesics as needed.  Muscle relaxants per medication orders.  Follow-up in 1 week.if no improvement.    Marysol Gamble, APRN

## 2020-03-19 NOTE — PROGRESS NOTES
Notify patient her repeat chest x-ray shows no right lung nodule.  Right lung scarring noted from previous pleurodesis.  No new abnormalities.  Negative right rib series.  No rib fractures.  Continue with current recommendations and notify us if no improvement.

## 2020-08-12 ENCOUNTER — RESULTS ENCOUNTER (OUTPATIENT)
Dept: FAMILY MEDICINE CLINIC | Facility: CLINIC | Age: 60
End: 2020-08-12

## 2020-08-12 DIAGNOSIS — Z51.81 MEDICATION MONITORING ENCOUNTER: ICD-10-CM

## 2020-08-12 DIAGNOSIS — K76.0 FATTY LIVER: ICD-10-CM

## 2020-08-12 DIAGNOSIS — Z00.00 ROUTINE ADULT HEALTH MAINTENANCE: ICD-10-CM

## 2020-08-14 ENCOUNTER — OFFICE VISIT (OUTPATIENT)
Dept: FAMILY MEDICINE CLINIC | Facility: CLINIC | Age: 60
End: 2020-08-14

## 2020-08-14 VITALS
DIASTOLIC BLOOD PRESSURE: 70 MMHG | HEART RATE: 86 BPM | OXYGEN SATURATION: 99 % | HEIGHT: 66 IN | WEIGHT: 156.4 LBS | BODY MASS INDEX: 25.13 KG/M2 | SYSTOLIC BLOOD PRESSURE: 118 MMHG

## 2020-08-14 DIAGNOSIS — J90: ICD-10-CM

## 2020-08-14 DIAGNOSIS — G89.12 POST-THORACOTOMY PAIN SYNDROME: Primary | ICD-10-CM

## 2020-08-14 PROCEDURE — 99213 OFFICE O/P EST LOW 20 MIN: CPT | Performed by: FAMILY MEDICINE

## 2020-08-14 RX ORDER — GABAPENTIN 400 MG/1
400 CAPSULE ORAL 3 TIMES DAILY
Qty: 90 CAPSULE | Refills: 5 | Status: SHIPPED | OUTPATIENT
Start: 2020-08-14 | End: 2021-02-03 | Stop reason: DRUGHIGH

## 2020-08-14 NOTE — PROGRESS NOTES
Subjective   Delilah Morse is a 59 y.o. female who is here for   Chief Complaint   Patient presents with   • Med Refill     gabapentin    .     History of Present Illness   Working from home  Lungs ok  Able to push mow her grass    Nerve lung cavity pain is stable    Lots of stress at home, she and son are suing ex-daughter in law for custody of 2 yr grandson.    The following portions of the patient's history were reviewed and updated as appropriate: allergies, current medications, past family history, past medical history, past social history, past surgical history and problem list.    Review of Systems    Objective   Physical Exam   Constitutional: She appears well-developed and well-nourished.   Cardiovascular: Normal rate.   Pulmonary/Chest: Effort normal.   Neurological: She is alert.   Nursing note and vitals reviewed.      Assessment/Plan   Delilah was seen today for med refill.    Diagnoses and all orders for this visit:    Post-thoracotomy pain syndrome    Pleurisy, serofibrinous  -     gabapentin (NEURONTIN) 400 MG capsule; Take 1 capsule by mouth 3 (Three) Times a Day.      There are no Patient Instructions on file for this visit.    Medications Discontinued During This Encounter   Medication Reason   • gabapentin (NEURONTIN) 400 MG capsule Reorder        No follow-ups on file.    Dr. Juarez García  [place], [place]

## 2020-10-23 LAB
ALBUMIN SERPL-MCNC: 4.4 G/DL (ref 3.8–4.9)
ALBUMIN/GLOB SERPL: 2 {RATIO} (ref 1.2–2.2)
ALP SERPL-CCNC: 83 IU/L (ref 39–117)
ALT SERPL-CCNC: 15 IU/L (ref 0–32)
APPEARANCE UR: CLEAR
AST SERPL-CCNC: 18 IU/L (ref 0–40)
BASOPHILS # BLD AUTO: 0.1 X10E3/UL (ref 0–0.2)
BASOPHILS NFR BLD AUTO: 2 %
BILIRUB SERPL-MCNC: <0.2 MG/DL (ref 0–1.2)
BILIRUB UR QL STRIP: NEGATIVE
BUN SERPL-MCNC: 18 MG/DL (ref 6–24)
BUN/CREAT SERPL: 24 (ref 9–23)
CALCIUM SERPL-MCNC: 9.5 MG/DL (ref 8.7–10.2)
CHLORIDE SERPL-SCNC: 104 MMOL/L (ref 96–106)
CHOLEST SERPL-MCNC: 205 MG/DL (ref 100–199)
CO2 SERPL-SCNC: 27 MMOL/L (ref 20–29)
COLOR UR: YELLOW
CREAT SERPL-MCNC: 0.75 MG/DL (ref 0.57–1)
EOSINOPHIL # BLD AUTO: 0.3 X10E3/UL (ref 0–0.4)
EOSINOPHIL NFR BLD AUTO: 6 %
ERYTHROCYTE [DISTWIDTH] IN BLOOD BY AUTOMATED COUNT: 13 % (ref 11.7–15.4)
GLOBULIN SER CALC-MCNC: 2.2 G/DL (ref 1.5–4.5)
GLUCOSE SERPL-MCNC: 94 MG/DL (ref 65–99)
GLUCOSE UR QL: NEGATIVE
HCT VFR BLD AUTO: 39.4 % (ref 34–46.6)
HDLC SERPL-MCNC: 76 MG/DL
HGB BLD-MCNC: 13.6 G/DL (ref 11.1–15.9)
HGB UR QL STRIP: NEGATIVE
IMM GRANULOCYTES # BLD AUTO: 0 X10E3/UL (ref 0–0.1)
IMM GRANULOCYTES NFR BLD AUTO: 0 %
KETONES UR QL STRIP: NEGATIVE
LDLC SERPL CALC-MCNC: 111 MG/DL (ref 0–99)
LEUKOCYTE ESTERASE UR QL STRIP: NEGATIVE
LYMPHOCYTES # BLD AUTO: 1.4 X10E3/UL (ref 0.7–3.1)
LYMPHOCYTES NFR BLD AUTO: 30 %
MCH RBC QN AUTO: 31.7 PG (ref 26.6–33)
MCHC RBC AUTO-ENTMCNC: 34.5 G/DL (ref 31.5–35.7)
MCV RBC AUTO: 92 FL (ref 79–97)
MICRO URNS: ABNORMAL
MONOCYTES # BLD AUTO: 0.4 X10E3/UL (ref 0.1–0.9)
MONOCYTES NFR BLD AUTO: 9 %
NEUTROPHILS # BLD AUTO: 2.5 X10E3/UL (ref 1.4–7)
NEUTROPHILS NFR BLD AUTO: 53 %
NITRITE UR QL STRIP: NEGATIVE
PH UR STRIP: 7 [PH] (ref 5–7.5)
PLATELET # BLD AUTO: 229 X10E3/UL (ref 150–450)
POTASSIUM SERPL-SCNC: 5.2 MMOL/L (ref 3.5–5.2)
PROT SERPL-MCNC: 6.6 G/DL (ref 6–8.5)
PROT UR QL STRIP: NEGATIVE
RBC # BLD AUTO: 4.29 X10E6/UL (ref 3.77–5.28)
SODIUM SERPL-SCNC: 140 MMOL/L (ref 134–144)
SP GR UR: <=1.005 (ref 1–1.03)
TRIGL SERPL-MCNC: 103 MG/DL (ref 0–149)
TSH SERPL DL<=0.005 MIU/L-ACNC: 1.83 UIU/ML (ref 0.45–4.5)
UROBILINOGEN UR STRIP-MCNC: 0.2 MG/DL (ref 0.2–1)
VLDLC SERPL CALC-MCNC: 18 MG/DL (ref 5–40)
WBC # BLD AUTO: 4.7 X10E3/UL (ref 3.4–10.8)

## 2020-10-27 LAB — DRUGS UR: NORMAL

## 2020-10-29 ENCOUNTER — OFFICE VISIT (OUTPATIENT)
Dept: FAMILY MEDICINE CLINIC | Facility: CLINIC | Age: 60
End: 2020-10-29

## 2020-10-29 VITALS
WEIGHT: 157 LBS | DIASTOLIC BLOOD PRESSURE: 80 MMHG | SYSTOLIC BLOOD PRESSURE: 120 MMHG | HEART RATE: 93 BPM | BODY MASS INDEX: 25.23 KG/M2 | OXYGEN SATURATION: 100 % | HEIGHT: 66 IN

## 2020-10-29 DIAGNOSIS — Z00.00 ROUTINE ADULT HEALTH MAINTENANCE: Primary | ICD-10-CM

## 2020-10-29 DIAGNOSIS — Z12.31 ENCOUNTER FOR SCREENING MAMMOGRAM FOR MALIGNANT NEOPLASM OF BREAST: ICD-10-CM

## 2020-10-29 PROCEDURE — 99396 PREV VISIT EST AGE 40-64: CPT | Performed by: FAMILY MEDICINE

## 2020-10-29 NOTE — PROGRESS NOTES
"  Chief Complaint   Patient presents with   • Annual Exam       Subjective   Delilah Morse is a 59 y.o. female and is here for a yearly physical exam. The patient reports no problems.    Do you take any herbs or supplements that were not prescribed by a doctor? no. If so, these will be added to active medication list.    The following portions of the patient's history were reviewed and updated as appropriate: allergies, current medications, past family history, past medical history, past social history, past surgical history and problem list.    Social and Family and Surgical History reviewed and updated today, see Rooming tab.    Health History, Preventive Measures and Vaccination flow sheets reviewed and updated today.    Patient's current medical chart in Epic; including previous office notes, Mychart messages, recent phone calls, imaging, labs, specialist's evaluation either in notes or in Media tab reviewed today.    Other outside pertinent medical information also reviewed thru Care Everywhere function is also reviewed today.    Review of Systems  Review of Systems  A comprehensive review of systems was negative except for: Respiratory: positive for chronic bronchitis    Vitals:    10/29/20 0815   BP: 120/80   BP Location: Left arm   Patient Position: Sitting   Cuff Size: Adult   Pulse: 93   SpO2: 100%   Weight: 71.2 kg (157 lb)   Height: 167.6 cm (66\")       General Appearance:  Alert, cooperative, no distress, appears stated age   Head:  Normocephalic, without obvious abnormality, atraumatic   Eyes:  PERRL, conjunctiva/corneas clear, EOM's intact.   Ears:  Normal TM's and external ear canals, both ears   Nose: Nares normal, septum midline, mucosa normal, no drainage or sinus tenderness   Throat: Lips, mucosa, and tongue normal; teeth and gums normal   Neck: Supple, symmetrical, trachea midline, no adenopathy;   thyroid: No enlargement/tenderness/nodules; no carotid  bruit   Back:  Symmetric, no " curvature, ROM normal, no CVA tenderness   Lungs:  Clear to auscultation bilaterally, respirations unlabored   Chest wall:  No tenderness or deformity   Heart:  Regular rate and rhythm, S1 and S2 normal, no murmur, rub or gallop   Abdomen:  Soft, non-tender, bowel sounds active all four quadrants,   no masses, no organomegaly   Rectal:        Extremities: Extremities normal, atraumatic, no cyanosis or edema   Pulses: 2+ and symmetric all extremities   Skin: Skin color, texture, turgor normal, no rashes or lesions   Lymph nodes: Cervical, supraclavicular, and axillary nodes normal   Neurologic: CNII-XII intact. Normal strength, sensation and reflexes   throughout          Results for orders placed or performed in visit on 08/12/20   Comprehensive Metabolic Panel    Specimen: Blood   Result Value Ref Range    Glucose 94 65 - 99 mg/dL    BUN 18 6 - 24 mg/dL    Creatinine 0.75 0.57 - 1.00 mg/dL    eGFR Non African Am 89 >59 mL/min/1.73    eGFR African Am 102 >59 mL/min/1.73    BUN/Creatinine Ratio 24 (H) 9 - 23    Sodium 140 134 - 144 mmol/L    Potassium 5.2 3.5 - 5.2 mmol/L    Chloride 104 96 - 106 mmol/L    Total CO2 27 20 - 29 mmol/L    Calcium 9.5 8.7 - 10.2 mg/dL    Total Protein 6.6 6.0 - 8.5 g/dL    Albumin 4.4 3.8 - 4.9 g/dL    Globulin 2.2 1.5 - 4.5 g/dL    A/G Ratio 2.0 1.2 - 2.2    Total Bilirubin <0.2 0.0 - 1.2 mg/dL    Alkaline Phosphatase 83 39 - 117 IU/L    AST (SGOT) 18 0 - 40 IU/L    ALT (SGPT) 15 0 - 32 IU/L   Lipid Panel    Specimen: Blood   Result Value Ref Range    Total Cholesterol 205 (H) 100 - 199 mg/dL    Triglycerides 103 0 - 149 mg/dL    HDL Cholesterol 76 >39 mg/dL    VLDL Cholesterol Jose Alfredo 18 5 - 40 mg/dL    LDL Chol Calc (NIH) 111 (H) 0 - 99 mg/dL   Compliance Drug Analysis, Ur - Urine, Clean Catch    Specimen: Urine, Clean Catch   Result Value Ref Range    Report Summary FINAL    TSH    Specimen: Blood   Result Value Ref Range    TSH 1.830 0.450 - 4.500 uIU/mL   Urinalysis With Microscopic  If Indicated (No Culture) - Urine, Clean Catch    Specimen: Urine, Clean Catch   Result Value Ref Range    Specific Gravity, UA      <=1.005 (A) 1.005 - 1.030    pH, UA 7.0 5.0 - 7.5    Color, UA Yellow Yellow    Appearance, UA Clear Clear    Leukocytes, UA Negative Negative    Protein Negative Negative/Trace    Glucose, UA Negative Negative    Ketones Negative Negative    Blood, UA Negative Negative    Bilirubin, UA Negative Negative    Urobilinogen, UA 0.2 0.2 - 1.0 mg/dL    Nitrite, UA Negative Negative    Microscopic Examination Comment    CBC & Differential    Specimen: Blood   Result Value Ref Range    WBC 4.7 3.4 - 10.8 x10E3/uL    RBC 4.29 3.77 - 5.28 x10E6/uL    Hemoglobin 13.6 11.1 - 15.9 g/dL    Hematocrit 39.4 34.0 - 46.6 %    MCV 92 79 - 97 fL    MCH 31.7 26.6 - 33.0 pg    MCHC 34.5 31.5 - 35.7 g/dL    RDW 13.0 11.7 - 15.4 %    Platelets 229 150 - 450 x10E3/uL    Neutrophil Rel % 53 Not Estab. %    Lymphocyte Rel % 30 Not Estab. %    Monocyte Rel % 9 Not Estab. %    Eosinophil Rel % 6 Not Estab. %    Basophil Rel % 2 Not Estab. %    Neutrophils Absolute 2.5 1.4 - 7.0 x10E3/uL    Lymphocytes Absolute 1.4 0.7 - 3.1 x10E3/uL    Monocytes Absolute 0.4 0.1 - 0.9 x10E3/uL    Eosinophils Absolute 0.3 0.0 - 0.4 x10E3/uL    Basophils Absolute 0.1 0.0 - 0.2 x10E3/uL    Immature Granulocyte Rel % 0 Not Estab. %    Immature Grans Absolute 0.0 0.0 - 0.1 x10E3/uL     Assessment/Plan   Healthy female exam.  Diagnoses and all orders for this visit:    1. Routine adult health maintenance (Primary)    2. Encounter for screening mammogram for malignant neoplasm of breast  -     Mammo screening digital tomosynthesis bilateral w CAD; Future      1. labsok  2. Patient Counseling:  --Nutrition: Stressed importance of moderation in sodium/caffeine intake, saturated fat and cholesterol.  Discussed caloric balance, sufficient intake of fresh fruits, vegetables, fiber, calcium, iron.  --Exercise: Stressed the importance of  regular exercise.   --Substance Abuse: Discussed cessation/primary prevention of tobacco, alcohol, or other drug use; driving or other dangerous activities under the influence.   No plans on quitting.     --Dental health: Discussed importance of regular tooth brushing, flossing, and dental visits.  --Suggested having eyes and vision checked if needed or past due.  --Immunizations reviewed and given if needed.  --Discussed benefits of screening colonoscopy and or [email or web address]  3. Discussed the patient's BMI with her.  The BMI is in the acceptable range  4. Follow up in 3 months    There are no Patient Instructions on file for this visit.    There are no discontinued medications.     Dr. Juarez García MD  [place], [place]  [facility]

## 2020-12-07 ENCOUNTER — HOSPITAL ENCOUNTER (OUTPATIENT)
Dept: MAMMOGRAPHY | Facility: HOSPITAL | Age: 60
Discharge: HOME | End: 2020-12-07
Admitting: FAMILY MEDICINE

## 2020-12-07 DIAGNOSIS — Z12.31 ENCOUNTER FOR SCREENING MAMMOGRAM FOR MALIGNANT NEOPLASM OF BREAST: ICD-10-CM

## 2020-12-07 PROCEDURE — 77067 SCR MAMMO BI INCL CAD: CPT

## 2020-12-07 PROCEDURE — 77063 BREAST TOMOSYNTHESIS BI: CPT

## 2021-01-07 ENCOUNTER — OFFICE VISIT (OUTPATIENT)
Dept: FAMILY MEDICINE CLINIC | Facility: CLINIC | Age: 61
End: 2021-01-07

## 2021-01-07 ENCOUNTER — TELEPHONE (OUTPATIENT)
Dept: FAMILY MEDICINE CLINIC | Facility: CLINIC | Age: 61
End: 2021-01-07

## 2021-01-07 DIAGNOSIS — Z20.822 CLOSE EXPOSURE TO COVID-19 VIRUS: ICD-10-CM

## 2021-01-07 DIAGNOSIS — B00.9 HSV INFECTION: Primary | ICD-10-CM

## 2021-01-07 PROCEDURE — 99442 PR PHYS/QHP TELEPHONE EVALUATION 11-20 MIN: CPT | Performed by: FAMILY MEDICINE

## 2021-01-07 RX ORDER — HYDROXYZINE PAMOATE 25 MG/1
25 CAPSULE ORAL 3 TIMES DAILY PRN
COMMUNITY
Start: 2020-11-17 | End: 2022-11-03

## 2021-01-07 RX ORDER — DULOXETIN HYDROCHLORIDE 60 MG/1
60 CAPSULE, DELAYED RELEASE ORAL EVERY MORNING
COMMUNITY
Start: 2020-12-14

## 2021-01-07 NOTE — PROGRESS NOTES
Subjective   Delilah Morse is a 60 y.o. female who is here for   Chief Complaint   Patient presents with   • Covid-19 Home Monitoring Phone Call   .     History of Present Illness   Today is telephone medical visit between Dr Juarez García and current patient.  Consent is given by patient for his encounter.  This is occurring during the current COVID-19 pandemic of 2020-1; with social isolation mandates per federal and state guidelines. Patient's chart has been reviewed.  Medical history from chart is reviewed. Patient location is per their choice. Provider location is in my routine medical office in [facility].  Regarding EM coding: history will not be as robust and exam will not be possible. Most EM coding decisions will be based on MDM and time. Time included pre and post charting and time spent speaking with the patient. Time spent for encounter is 15 minutes.    Telephone visit for possible COVID-19.  Ms. Marquez has been watching her 2 grandkids.  As a grandkids  was shut down due to Covid exposure.  One of the grandkids had a mild URI last week.  Patient herself became ill last week.  With stuffy nose sore throat and cough.  Also having not nausea and diarrhea.    Then this has triggered her usual HSV infection.  That has erupted with painful blisters on her buttocks, perineum and perivaginal area.  already on her home Valtrex for this.      The following portions of the patient's history were reviewed and updated as appropriate: allergies, current medications, past family history, past medical history, past social history, past surgical history and problem list.    Review of Systems    Objective   Physical Exam    Assessment/Plan   Diagnoses and all orders for this visit:    1. HSV infection (Primary)    2. Close exposure to COVID-19 virus  -     COVID-19,[facility] ROUTINE, NP/OP SWAB IN TRANSPORT MEDIA OR ESWAB 72 HR TAT - Swab, Nasopharynx      There are no Patient Instructions on  file for this visit.    There are no discontinued medications.     No follow-ups on file.    Dr. Juarez García  [place], [place]

## 2021-01-07 NOTE — TELEPHONE ENCOUNTER
Caller: Delilah Morse    Relationship to patient: Self    Best call back number: [phone]    Concerns or Questions if Applicable: PATIENT HAS SHINGLES ON HER BOTTOM, VOMITING, DIARRHEA, HEADACHE, BLISTERS.. THIS IS HER 4TH OR SO TIME HAVING SHINGLES BUT SHE IS CURIOUS IF DR. TAVARES THINKS SHE SHOULD GET A COVID-19 TEST JUST TO MAKE SURE IT ISN'T COVID ALSO..       Travel screen questions: SHORTNESS OF BREATH/ CHEST PAIN, FEVER/CHILLS.

## 2021-01-09 LAB — SARS-COV-2 RNA RESP QL NAA+PROBE: NOT DETECTED

## 2021-02-03 ENCOUNTER — OFFICE VISIT (OUTPATIENT)
Dept: FAMILY MEDICINE CLINIC | Facility: CLINIC | Age: 61
End: 2021-02-03

## 2021-02-03 VITALS
OXYGEN SATURATION: 97 % | BODY MASS INDEX: 26.03 KG/M2 | HEART RATE: 102 BPM | WEIGHT: 162 LBS | DIASTOLIC BLOOD PRESSURE: 78 MMHG | HEIGHT: 66 IN | SYSTOLIC BLOOD PRESSURE: 120 MMHG

## 2021-02-03 DIAGNOSIS — G89.12 POST-THORACOTOMY PAIN SYNDROME: Primary | ICD-10-CM

## 2021-02-03 PROCEDURE — 99213 OFFICE O/P EST LOW 20 MIN: CPT | Performed by: FAMILY MEDICINE

## 2021-02-03 RX ORDER — GABAPENTIN 100 MG/1
100 CAPSULE ORAL 3 TIMES DAILY
Qty: 90 CAPSULE | Refills: 1 | Status: SHIPPED | OUTPATIENT
Start: 2021-02-03 | End: 2021-11-01

## 2021-02-03 RX ORDER — BUPROPION HYDROCHLORIDE 100 MG/1
TABLET, EXTENDED RELEASE ORAL 2 TIMES DAILY
COMMUNITY
Start: 2021-01-19 | End: 2021-03-17 | Stop reason: ALTCHOICE

## 2021-02-03 NOTE — PROGRESS NOTES
Subjective   Delilah Morse is a 60 y.o. female who is here for   Chief Complaint   Patient presents with   • Pain With Breathing   .     History of Present Illness   Urvashi states she is trying to wean herself down off the gabapentin.  She is now seeing a new psychiatrist.  She is now on Cymbalta for anxiety and stress.  She now also on Wellbutrin to help her with her mood as well as to stop smoking.  She is smoking 1 to 2 packs cigarettes most of her adult life.  Is also trying to cut back on her alcohol consumption.  She is been a heavy drinker most of her life.  Her  who is also a patient mine is also a heavy drinker.    She having some shakes perhaps some withdrawal from the alcohol or the gabapentin.  She was on 400 mg 3 times daily she cut herself down to 1 a day.  Is fairly reasonable, but we will make it 100 mg 3 times daily to help the withdrawal symptoms all the gabapentin.  We will see her back in 6 weeks see how she is doing    The following portions of the patient's history were reviewed and updated as appropriate: allergies, current medications, past family history, past medical history, past social history, past surgical history and problem list.    Review of Systems    Objective   Physical Exam  Pulmonary:      Breath sounds: Wheezing present.   Neurological:      Motor: Tremor present.         Assessment/Plan   Diagnoses and all orders for this visit:    1. Post-thoracotomy pain syndrome (Primary)  -     gabapentin (NEURONTIN) 100 MG capsule; Take 1 capsule by mouth 3 (Three) Times a Day.  Dispense: 90 capsule; Refill: 1    Praise was given to the patient today for her efforts to reduce her smoking cigarettes and drinking alcohol.    There are no Patient Instructions on file for this visit.    Medications Discontinued During This Encounter   Medication Reason   • gabapentin (NEURONTIN) 400 MG capsule Dose adjustment        Return in about 6 weeks (around 3/17/2021).    Dr. Pizarro  [place]  [place], [place]

## 2021-03-17 ENCOUNTER — OFFICE VISIT (OUTPATIENT)
Dept: FAMILY MEDICINE CLINIC | Facility: CLINIC | Age: 61
End: 2021-03-17

## 2021-03-17 VITALS
HEIGHT: 66 IN | BODY MASS INDEX: 26.2 KG/M2 | TEMPERATURE: 96.9 F | SYSTOLIC BLOOD PRESSURE: 120 MMHG | OXYGEN SATURATION: 100 % | WEIGHT: 163 LBS | HEART RATE: 101 BPM | DIASTOLIC BLOOD PRESSURE: 76 MMHG

## 2021-03-17 DIAGNOSIS — R25.1 TREMOR OF BOTH HANDS: ICD-10-CM

## 2021-03-17 DIAGNOSIS — G89.12 POST-THORACOTOMY PAIN SYNDROME: Primary | ICD-10-CM

## 2021-03-17 PROBLEM — Z20.822 CLOSE EXPOSURE TO COVID-19 VIRUS: Status: RESOLVED | Noted: 2021-01-07 | Resolved: 2021-03-17

## 2021-03-17 PROCEDURE — 99213 OFFICE O/P EST LOW 20 MIN: CPT | Performed by: FAMILY MEDICINE

## 2021-03-17 RX ORDER — BUPROPION HYDROCHLORIDE 150 MG/1
150 TABLET, EXTENDED RELEASE ORAL 2 TIMES DAILY
COMMUNITY
Start: 2021-03-11 | End: 2021-11-01

## 2021-03-17 NOTE — PROGRESS NOTES
Subjective   Delilah Morse is a 60 y.o. female who is here for   Chief Complaint   Patient presents with   • Pain With Breathing   .     History of Present Illness   Follow-up tapering off gabapentin.  Patient was on gabapentin 4 mg 3 times a day.  She is attempting to wean herself down.  So last visit we weaned her down to 100 mg 3 times a day.  Her postthoracotomy for multiple pneumothorax these pain is about the same.    She does complain of more tremor in her both of her hands, difficult to apply make-up.    She is also seeing psychiatry.  She is now on higher dose Wellbutrin.  Which is help her stop smoking.    Last visit she had reduced her drinking, unfortunately she started drinking heavily again.    There is no family history of tremor, therefore tremor is probably most likely due to her lifelong heavy nighttime drinking    She may continue to wean off the gabapentin if she sees fit, by taking twice daily dose for a week, then taken 1 a day for a week, then stopping        The following portions of the patient's history were reviewed and updated as appropriate: allergies, current medications, past family history, past medical history, past social history, past surgical history and problem list.    Review of Systems    Objective   Physical Exam  Neurological:      Mental Status: She is alert.      Motor: Tremor present.   Psychiatric:         Mood and Affect: Mood normal.         Assessment/Plan   Diagnoses and all orders for this visit:    1. Post-thoracotomy pain syndrome (Primary)    2. Tremor of both hands    okay to try to wean off gabapentin: take 2 a day for 1 week  Then 1 a day for a week, then stop.    There are no Patient Instructions on file for this visit.    Medications Discontinued During This Encounter   Medication Reason   • Tiotropium Bromide-Olodaterol (STIOLTO RESPIMAT IN) *Therapy completed   • buPROPion SR (WELLBUTRIN SR) 100 MG 12 hr tablet Discontinued by another clinician         No follow-ups on file.    Dr. Juarez García  [place], [place]

## 2021-10-13 DIAGNOSIS — Z00.00 ROUTINE ADULT HEALTH MAINTENANCE: Primary | ICD-10-CM

## 2021-10-13 DIAGNOSIS — Z51.81 MEDICATION MONITORING ENCOUNTER: ICD-10-CM

## 2021-10-14 DIAGNOSIS — Z00.00 ROUTINE ADULT HEALTH MAINTENANCE: ICD-10-CM

## 2021-10-26 LAB
ALBUMIN SERPL-MCNC: 4.6 G/DL (ref 3.8–4.9)
ALBUMIN/GLOB SERPL: 2.1 {RATIO} (ref 1.2–2.2)
ALP SERPL-CCNC: 92 IU/L (ref 44–121)
ALT SERPL-CCNC: 17 IU/L (ref 0–32)
AST SERPL-CCNC: 17 IU/L (ref 0–40)
BILIRUB SERPL-MCNC: 0.3 MG/DL (ref 0–1.2)
BUN SERPL-MCNC: 21 MG/DL (ref 6–24)
BUN/CREAT SERPL: 29 (ref 9–23)
CALCIUM SERPL-MCNC: 9.8 MG/DL (ref 8.7–10.2)
CHLORIDE SERPL-SCNC: 100 MMOL/L (ref 96–106)
CHOLEST SERPL-MCNC: 266 MG/DL (ref 100–199)
CO2 SERPL-SCNC: 22 MMOL/L (ref 20–29)
CREAT SERPL-MCNC: 0.73 MG/DL (ref 0.57–1)
ERYTHROCYTE [DISTWIDTH] IN BLOOD BY AUTOMATED COUNT: 12.5 % (ref 11.7–15.4)
GLOBULIN SER CALC-MCNC: 2.2 G/DL (ref 1.5–4.5)
GLUCOSE SERPL-MCNC: 102 MG/DL (ref 65–99)
HCT VFR BLD AUTO: 40.2 % (ref 34–46.6)
HDLC SERPL-MCNC: 103 MG/DL
HGB BLD-MCNC: 13.5 G/DL (ref 11.1–15.9)
LDLC SERPL CALC-MCNC: 148 MG/DL (ref 0–99)
MCH RBC QN AUTO: 31.4 PG (ref 26.6–33)
MCHC RBC AUTO-ENTMCNC: 33.6 G/DL (ref 31.5–35.7)
MCV RBC AUTO: 94 FL (ref 79–97)
PLATELET # BLD AUTO: 251 X10E3/UL (ref 150–450)
POTASSIUM SERPL-SCNC: 4.5 MMOL/L (ref 3.5–5.2)
PROT SERPL-MCNC: 6.8 G/DL (ref 6–8.5)
RBC # BLD AUTO: 4.3 X10E6/UL (ref 3.77–5.28)
SODIUM SERPL-SCNC: 137 MMOL/L (ref 134–144)
TRIGL SERPL-MCNC: 92 MG/DL (ref 0–149)
TSH SERPL DL<=0.005 MIU/L-ACNC: 1.27 UIU/ML (ref 0.45–4.5)
VLDLC SERPL CALC-MCNC: 15 MG/DL (ref 5–40)
WBC # BLD AUTO: 5.5 X10E3/UL (ref 3.4–10.8)

## 2021-11-01 ENCOUNTER — OFFICE VISIT (OUTPATIENT)
Dept: FAMILY MEDICINE CLINIC | Facility: CLINIC | Age: 61
End: 2021-11-01

## 2021-11-01 VITALS
HEIGHT: 66 IN | OXYGEN SATURATION: 98 % | DIASTOLIC BLOOD PRESSURE: 80 MMHG | SYSTOLIC BLOOD PRESSURE: 138 MMHG | TEMPERATURE: 97.3 F | WEIGHT: 162 LBS | HEART RATE: 118 BPM | BODY MASS INDEX: 26.03 KG/M2

## 2021-11-01 DIAGNOSIS — Z00.00 ROUTINE ADULT HEALTH MAINTENANCE: Primary | ICD-10-CM

## 2021-11-01 DIAGNOSIS — J90: ICD-10-CM

## 2021-11-01 DIAGNOSIS — Z12.31 ENCOUNTER FOR SCREENING MAMMOGRAM FOR MALIGNANT NEOPLASM OF BREAST: ICD-10-CM

## 2021-11-01 PROBLEM — F98.8 ADD (ATTENTION DEFICIT DISORDER) WITHOUT HYPERACTIVITY: Status: ACTIVE | Noted: 2021-11-01

## 2021-11-01 PROCEDURE — 99396 PREV VISIT EST AGE 40-64: CPT | Performed by: FAMILY MEDICINE

## 2021-11-01 RX ORDER — LISDEXAMFETAMINE DIMESYLATE 40 MG/1
CAPSULE ORAL DAILY
COMMUNITY
Start: 2021-10-04 | End: 2022-11-03 | Stop reason: DRUGHIGH

## 2022-10-19 DIAGNOSIS — E78.2 MODERATE MIXED HYPERLIPIDEMIA NOT REQUIRING STATIN THERAPY: ICD-10-CM

## 2022-10-19 DIAGNOSIS — Z00.00 ROUTINE ADULT HEALTH MAINTENANCE: ICD-10-CM

## 2022-10-19 DIAGNOSIS — Z00.00 ROUTINE ADULT HEALTH MAINTENANCE: Primary | ICD-10-CM

## 2022-10-28 LAB
ALBUMIN SERPL-MCNC: 4.4 G/DL (ref 3.5–5.2)
ALBUMIN/GLOB SERPL: 2.2 G/DL
ALP SERPL-CCNC: 84 U/L (ref 39–117)
ALT SERPL-CCNC: 20 U/L (ref 1–33)
APPEARANCE UR: CLEAR
AST SERPL-CCNC: 20 U/L (ref 1–32)
BILIRUB SERPL-MCNC: 0.2 MG/DL (ref 0–1.2)
BILIRUB UR QL STRIP: NEGATIVE
BUN SERPL-MCNC: 18 MG/DL (ref 6–20)
BUN/CREAT SERPL: 26.1 (ref 7–25)
CALCIUM SERPL-MCNC: 9.1 MG/DL (ref 8.6–10.5)
CHLORIDE SERPL-SCNC: 102 MMOL/L (ref 98–107)
CHOLEST SERPL-MCNC: 266 MG/DL (ref 0–200)
CO2 SERPL-SCNC: 28.4 MMOL/L (ref 22–29)
COLOR UR: YELLOW
CREAT SERPL-MCNC: 0.69 MG/DL (ref 0.57–1)
EGFRCR SERPLBLD CKD-EPI 2021: 100.1 ML/MIN/1.73
ERYTHROCYTE [DISTWIDTH] IN BLOOD BY AUTOMATED COUNT: 12.6 % (ref 12.3–15.4)
GLOBULIN SER CALC-MCNC: 2 GM/DL
GLUCOSE SERPL-MCNC: 96 MG/DL (ref 65–99)
GLUCOSE UR QL STRIP: NEGATIVE
HCT VFR BLD AUTO: 40.5 % (ref 34–46.6)
HDLC SERPL-MCNC: 88 MG/DL (ref 40–60)
HGB BLD-MCNC: 13.6 G/DL (ref 12–15.9)
HGB UR QL STRIP: NEGATIVE
KETONES UR QL STRIP: NEGATIVE
LDLC SERPL CALC-MCNC: 150 MG/DL (ref 0–100)
LEUKOCYTE ESTERASE UR QL STRIP: NEGATIVE
MCH RBC QN AUTO: 31 PG (ref 26.6–33)
MCHC RBC AUTO-ENTMCNC: 33.6 G/DL (ref 31.5–35.7)
MCV RBC AUTO: 92.3 FL (ref 79–97)
NITRITE UR QL STRIP: NEGATIVE
PH UR STRIP: 5.5 [PH] (ref 5–8)
PLATELET # BLD AUTO: 254 10*3/MM3 (ref 140–450)
POTASSIUM SERPL-SCNC: 4.5 MMOL/L (ref 3.5–5.2)
PROT SERPL-MCNC: 6.4 G/DL (ref 6–8.5)
PROT UR QL STRIP: NEGATIVE
RBC # BLD AUTO: 4.39 10*6/MM3 (ref 3.77–5.28)
SODIUM SERPL-SCNC: 138 MMOL/L (ref 136–145)
SP GR UR STRIP: 1.01 (ref 1–1.03)
TRIGL SERPL-MCNC: 162 MG/DL (ref 0–150)
TSH SERPL DL<=0.005 MIU/L-ACNC: 2.2 UIU/ML (ref 0.27–4.2)
UROBILINOGEN UR STRIP-MCNC: NORMAL MG/DL
VLDLC SERPL CALC-MCNC: 28 MG/DL (ref 5–40)
WBC # BLD AUTO: 5.74 10*3/MM3 (ref 3.4–10.8)

## 2022-11-03 ENCOUNTER — OFFICE VISIT (OUTPATIENT)
Dept: FAMILY MEDICINE CLINIC | Facility: CLINIC | Age: 62
End: 2022-11-03

## 2022-11-03 VITALS
TEMPERATURE: 97.3 F | HEART RATE: 97 BPM | OXYGEN SATURATION: 97 % | DIASTOLIC BLOOD PRESSURE: 84 MMHG | WEIGHT: 158 LBS | BODY MASS INDEX: 25.39 KG/M2 | SYSTOLIC BLOOD PRESSURE: 140 MMHG | HEIGHT: 66 IN

## 2022-11-03 DIAGNOSIS — E78.2 MODERATE MIXED HYPERLIPIDEMIA NOT REQUIRING STATIN THERAPY: ICD-10-CM

## 2022-11-03 DIAGNOSIS — Z00.00 ROUTINE ADULT HEALTH MAINTENANCE: Primary | ICD-10-CM

## 2022-11-03 DIAGNOSIS — Z12.31 ENCOUNTER FOR SCREENING MAMMOGRAM FOR MALIGNANT NEOPLASM OF BREAST: ICD-10-CM

## 2022-11-03 PROBLEM — Z28.20 VACCINE REFUSED BY PATIENT: Status: ACTIVE | Noted: 2022-11-03

## 2022-11-03 PROCEDURE — 99396 PREV VISIT EST AGE 40-64: CPT | Performed by: FAMILY MEDICINE

## 2022-11-03 RX ORDER — DULOXETIN HYDROCHLORIDE 30 MG/1
CAPSULE, DELAYED RELEASE ORAL DAILY
COMMUNITY
Start: 2022-10-26

## 2022-11-03 NOTE — PROGRESS NOTES
"  Chief Complaint   Patient presents with   • Annual Exam       Subjective   Delilah Morse is a 61 y.o. female and is here for a yearly physical exam. The patient reports no problems.    Do you take any herbs or supplements that were not prescribed by a doctor? no. If so, these will be added to active medication list.    The following portions of the patient's history were reviewed and updated as appropriate: allergies, current medications, past family history, past medical history, past social history, past surgical history and problem list.    Social and Family and Surgical History reviewed and updated today, see Rooming tab.    Health History, Preventive Measures and Vaccination flow sheets reviewed and updated today.    Patient's current medical chart in Epic; including previous office notes, Mychart messages, recent phone calls, imaging, labs, specialist's evaluation either in notes or in Media tab reviewed today.    Other outside pertinent medical information also reviewed thru Care Everywhere function is also reviewed today.    Review of Systems  Review of Systems  Pertinent items are noted in HPI.    Vitals:    11/03/22 0816   BP: 140/84   BP Location: Left arm   Patient Position: Sitting   Cuff Size: Adult   Pulse: 97   Temp: 97.3 °F (36.3 °C)   SpO2: 97%   Weight: 71.7 kg (158 lb)   Height: 167.6 cm (66\")     Body mass index is 25.5 kg/m².      General Appearance:  Alert, cooperative, no distress, appears stated age   Head:  Normocephalic, without obvious abnormality, atraumatic   Eyes:  PERRL, conjunctiva/corneas clear, EOM's intact.   Ears:  Normal TM's and external ear canals, both ears   Nose: Nares normal, septum midline, mucosa normal, no drainage or sinus tenderness   Throat: Lips, mucosa, and tongue normal; teeth and gums normal   Neck: Supple, symmetrical, trachea midline, no adenopathy;   thyroid: No enlargement/tenderness/nodules; no carotid  bruit   Back:  Symmetric, no curvature, ROM " normal, no CVA tenderness   Lungs:  Clear to auscultation bilaterally, respirations unlabored   Chest wall:  No tenderness or deformity   Heart:  Regular rate and rhythm, S1 and S2 normal, no murmur, rub or gallop   Abdomen:  Soft, non-tender, bowel sounds active all four quadrants,   no masses, no organomegaly   Rectal:        Extremities: Extremities normal, atraumatic, no cyanosis or edema   Pulses: 2+ and symmetric all extremities   Skin: Skin color, texture, turgor normal, no rashes or lesions   Lymph nodes: Cervical, supraclavicular, and axillary nodes normal   Neurologic: CNII-XII intact. Normal strength, sensation and reflexes   throughout          Results for orders placed or performed in visit on 10/19/22   Urinalysis With Microscopic If Indicated (No Culture) - Urine, Clean Catch    Specimen: Urine, Clean Catch   Result Value Ref Range    Specific Gravity, UA 1.013 1.005 - 1.030    pH, UA 5.5 5.0 - 8.0    Color, UA Yellow     Appearance, UA Clear Clear    Leukocytes, UA Negative Negative    Protein Negative Negative    Glucose, UA Negative Negative    Ketones Negative Negative    Blood, UA Negative Negative    Bilirubin, UA Negative Negative    Urobilinogen, UA Comment     Nitrite, UA Negative Negative   TSH    Specimen: Blood   Result Value Ref Range    TSH 2.200 0.270 - 4.200 uIU/mL   CBC (No Diff)    Specimen: Blood   Result Value Ref Range    WBC 5.74 3.40 - 10.80 10*3/mm3    RBC 4.39 3.77 - 5.28 10*6/mm3    Hemoglobin 13.6 12.0 - 15.9 g/dL    Hematocrit 40.5 34.0 - 46.6 %    MCV 92.3 79.0 - 97.0 fL    MCH 31.0 26.6 - 33.0 pg    MCHC 33.6 31.5 - 35.7 g/dL    RDW 12.6 12.3 - 15.4 %    Platelets 254 140 - 450 10*3/mm3   Comprehensive Metabolic Panel    Specimen: Blood   Result Value Ref Range    Glucose 96 65 - 99 mg/dL    BUN 18 6 - 20 mg/dL    Creatinine 0.69 0.57 - 1.00 mg/dL    EGFR Result 100.1 >60.0 mL/min/1.73    BUN/Creatinine Ratio 26.1 (H) 7.0 - 25.0    Sodium 138 136 - 145 mmol/L    Potassium  4.5 3.5 - 5.2 mmol/L    Chloride 102 98 - 107 mmol/L    Total CO2 28.4 22.0 - 29.0 mmol/L    Calcium 9.1 8.6 - 10.5 mg/dL    Total Protein 6.4 6.0 - 8.5 g/dL    Albumin 4.40 3.50 - 5.20 g/dL    Globulin 2.0 gm/dL    A/G Ratio 2.2 g/dL    Total Bilirubin 0.2 0.0 - 1.2 mg/dL    Alkaline Phosphatase 84 39 - 117 U/L    AST (SGOT) 20 1 - 32 U/L    ALT (SGPT) 20 1 - 33 U/L   Lipid Panel    Specimen: Blood   Result Value Ref Range    Total Cholesterol 266 (H) 0 - 200 mg/dL    Triglycerides 162 (H) 0 - 150 mg/dL    HDL Cholesterol 88 (H) 40 - 60 mg/dL    VLDL Cholesterol Jose Alfredo 28 5 - 40 mg/dL    LDL Chol Calc (NIH) 150 (H) 0 - 100 mg/dL     Assessment & Plan   Healthy female exam.  Diagnoses and all orders for this visit:    1. Routine adult health maintenance (Primary)  -     CBC (No Diff); Future  -     Comprehensive Metabolic Panel; Future  -     Lipid Panel; Future  -     Urinalysis With Microscopic If Indicated (No Culture) - Urine, Clean Catch; Future    2. Encounter for screening mammogram for malignant neoplasm of breast  -     Mammo screening digital tomosynthesis bilateral w CAD; Future    3. Moderate mixed hyperlipidemia not requiring statin therapy  -     Lipid Panel; Future      1. Doing well  Improved on Vyvanse  Stopped smoking  2. Patient Counseling:  --Nutrition: Stressed importance of moderation in sodium/caffeine intake, saturated fat and cholesterol.  Discussed caloric balance, sufficient intake of fresh fruits, vegetables, fiber, calcium, iron.  --Exercise: Stressed the importance of regular exercise.   --Substance Abuse: Discussed cessation/primary prevention of tobacco, alcohol, or other drug use; driving or other dangerous activities under the influence.    --Dental health: Discussed importance of regular tooth brushing, flossing, and dental visits.  --Suggested having eyes and vision checked if needed or past due.  --Immunizations reviewed and given if needed. Refuses all.    --Discussed benefits of  screening colonoscopy and or ColoGuard.  3. Discussed the patient's BMI with her.  The BMI is in the acceptable range  4. Follow up in one year    There are no Patient Instructions on file for this visit.    Medications Discontinued During This Encounter   Medication Reason   • hydrOXYzine pamoate (VISTARIL) 25 MG capsule *Therapy completed   • Vyvanse 40 MG capsule Dose adjustment        Dr. Juarez García MD  [place], [place]  [facility]

## 2022-11-11 ENCOUNTER — HOSPITAL ENCOUNTER (OUTPATIENT)
Dept: MAMMOGRAPHY | Facility: HOSPITAL | Age: 62
Discharge: HOME | End: 2022-11-11
Admitting: FAMILY MEDICINE

## 2022-11-11 DIAGNOSIS — Z12.31 ENCOUNTER FOR SCREENING MAMMOGRAM FOR MALIGNANT NEOPLASM OF BREAST: ICD-10-CM

## 2022-11-11 PROCEDURE — 77067 SCR MAMMO BI INCL CAD: CPT

## 2022-11-11 PROCEDURE — 77063 BREAST TOMOSYNTHESIS BI: CPT

## (undated) DEVICE — GOWN ISOL W/THUMB UNIV BLU BX/15

## (undated) DEVICE — ENDO. PORT CONNECTOR W/VALVE FOR OLYMPUS® SCOPES: Brand: ERBE

## (undated) DEVICE — SPNG GZ WOVN 4X4IN 12PLY 10/BX STRL

## (undated) DEVICE — GLV SURG SENSICARE MICRO PF LF 6 STRL

## (undated) DEVICE — MASK,FACE,FLUID RESIST,SHLD,EARLOOP: Brand: MEDLINE

## (undated) DEVICE — FRCP BX RADJAW4 NDL 2.8 240CM LG OG BX40

## (undated) DEVICE — JACKT LAB KNIT COLR LG BLU

## (undated) DEVICE — Device

## (undated) DEVICE — SUCTION CANISTER, 3000CC,SAFELINER: Brand: DEROYAL

## (undated) DEVICE — Device: Brand: DEFENDO AIR/WATER/SUCTION AND BIOPSY VALVE

## (undated) DEVICE — BW-412T DISP COMBO CLEANING BRUSH: Brand: SINGLE USE COMBINATION CLEANING BRUSH

## (undated) DEVICE — SYR LL 3CC

## (undated) DEVICE — VIAL FORMALIN CAP 10P 40ML